# Patient Record
Sex: FEMALE | Race: WHITE | NOT HISPANIC OR LATINO | Employment: UNEMPLOYED | URBAN - METROPOLITAN AREA
[De-identification: names, ages, dates, MRNs, and addresses within clinical notes are randomized per-mention and may not be internally consistent; named-entity substitution may affect disease eponyms.]

---

## 2024-09-07 ENCOUNTER — APPOINTMENT (OUTPATIENT)
Dept: LAB | Facility: CLINIC | Age: 59
End: 2024-09-07
Payer: MEDICARE

## 2024-09-07 DIAGNOSIS — G43.E09 CHRONIC MIGRAINE WITH AURA WITHOUT STATUS MIGRAINOSUS, NOT INTRACTABLE: ICD-10-CM

## 2024-09-07 LAB
ANION GAP SERPL CALCULATED.3IONS-SCNC: 8 MMOL/L (ref 4–13)
BUN SERPL-MCNC: 7 MG/DL (ref 5–25)
CALCIUM SERPL-MCNC: 8.9 MG/DL (ref 8.4–10.2)
CHLORIDE SERPL-SCNC: 98 MMOL/L (ref 96–108)
CO2 SERPL-SCNC: 25 MMOL/L (ref 21–32)
CREAT SERPL-MCNC: 0.56 MG/DL (ref 0.6–1.3)
GFR SERPL CREATININE-BSD FRML MDRD: 102 ML/MIN/1.73SQ M
GLUCOSE P FAST SERPL-MCNC: 195 MG/DL (ref 65–99)
POTASSIUM SERPL-SCNC: 4.5 MMOL/L (ref 3.5–5.3)
SODIUM SERPL-SCNC: 131 MMOL/L (ref 135–147)

## 2024-09-07 PROCEDURE — 36415 COLL VENOUS BLD VENIPUNCTURE: CPT

## 2024-09-07 PROCEDURE — 80048 BASIC METABOLIC PNL TOTAL CA: CPT

## 2024-09-10 ENCOUNTER — HOSPITAL ENCOUNTER (OUTPATIENT)
Dept: MRI IMAGING | Facility: HOSPITAL | Age: 59
Discharge: HOME | End: 2024-09-10
Attending: STUDENT IN AN ORGANIZED HEALTH CARE EDUCATION/TRAINING PROGRAM
Payer: MEDICARE

## 2024-09-10 DIAGNOSIS — G43.E09 CHRONIC MIGRAINE WITH AURA WITHOUT STATUS MIGRAINOSUS, NOT INTRACTABLE: ICD-10-CM

## 2024-09-10 PROCEDURE — A9585 GADOBUTROL INJECTION: HCPCS | Performed by: STUDENT IN AN ORGANIZED HEALTH CARE EDUCATION/TRAINING PROGRAM

## 2024-09-10 PROCEDURE — 70553 MRI BRAIN STEM W/O & W/DYE: CPT

## 2024-09-10 RX ORDER — GADOBUTROL 604.72 MG/ML
8 INJECTION INTRAVENOUS
Status: COMPLETED | OUTPATIENT
Start: 2024-09-10 | End: 2024-09-10

## 2024-09-10 RX ADMIN — GADOBUTROL 8 ML: 604.72 INJECTION INTRAVENOUS at 07:10

## 2024-09-24 LAB
LEFT EYE DIABETIC RETINOPATHY: NORMAL
RIGHT EYE DIABETIC RETINOPATHY: NORMAL

## 2024-10-04 ENCOUNTER — OFFICE VISIT (OUTPATIENT)
Age: 59
End: 2024-10-04
Payer: MEDICARE

## 2024-10-04 VITALS
HEART RATE: 98 BPM | SYSTOLIC BLOOD PRESSURE: 128 MMHG | DIASTOLIC BLOOD PRESSURE: 80 MMHG | WEIGHT: 184 LBS | HEIGHT: 66 IN | RESPIRATION RATE: 18 BRPM | BODY MASS INDEX: 29.57 KG/M2 | TEMPERATURE: 97.7 F

## 2024-10-04 DIAGNOSIS — M25.512 CHRONIC LEFT SHOULDER PAIN: ICD-10-CM

## 2024-10-04 DIAGNOSIS — Z23 NEED FOR COVID-19 VACCINE: ICD-10-CM

## 2024-10-04 DIAGNOSIS — Z11.59 NEED FOR HEPATITIS C SCREENING TEST: ICD-10-CM

## 2024-10-04 DIAGNOSIS — I10 ESSENTIAL HYPERTENSION: ICD-10-CM

## 2024-10-04 DIAGNOSIS — E55.9 VITAMIN D DEFICIENCY: ICD-10-CM

## 2024-10-04 DIAGNOSIS — Z12.4 SCREENING FOR CERVICAL CANCER: ICD-10-CM

## 2024-10-04 DIAGNOSIS — F17.200 TOBACCO DEPENDENCE SYNDROME: ICD-10-CM

## 2024-10-04 DIAGNOSIS — Z23 NEED FOR IMMUNIZATION AGAINST INFLUENZA: ICD-10-CM

## 2024-10-04 DIAGNOSIS — G43.E09 CHRONIC MIGRAINE WITH AURA WITHOUT STATUS MIGRAINOSUS, NOT INTRACTABLE: ICD-10-CM

## 2024-10-04 DIAGNOSIS — E11.9 CONTROLLED TYPE 2 DIABETES MELLITUS WITHOUT COMPLICATION, WITHOUT LONG-TERM CURRENT USE OF INSULIN (HCC): Primary | ICD-10-CM

## 2024-10-04 DIAGNOSIS — G89.29 CHRONIC LEFT SHOULDER PAIN: ICD-10-CM

## 2024-10-04 DIAGNOSIS — K21.9 GASTROESOPHAGEAL REFLUX DISEASE WITHOUT ESOPHAGITIS: ICD-10-CM

## 2024-10-04 DIAGNOSIS — E78.2 MIXED HYPERLIPIDEMIA: ICD-10-CM

## 2024-10-04 DIAGNOSIS — F41.8 MIXED ANXIETY DEPRESSIVE DISORDER: ICD-10-CM

## 2024-10-04 PROBLEM — F10.90 ALCOHOL USE: Status: ACTIVE | Noted: 2023-09-17

## 2024-10-04 PROBLEM — Z78.9 ALCOHOL USE: Status: ACTIVE | Noted: 2023-09-17

## 2024-10-04 PROCEDURE — 91320 SARSCV2 VAC 30MCG TRS-SUC IM: CPT

## 2024-10-04 PROCEDURE — 90673 RIV3 VACCINE NO PRESERV IM: CPT

## 2024-10-04 PROCEDURE — 90480 ADMN SARSCOV2 VAC 1/ONLY CMP: CPT

## 2024-10-04 PROCEDURE — 90471 IMMUNIZATION ADMIN: CPT

## 2024-10-04 PROCEDURE — 99214 OFFICE O/P EST MOD 30 MIN: CPT

## 2024-10-04 RX ORDER — MULTIVITAMIN
1 TABLET ORAL DAILY
COMMUNITY

## 2024-10-04 NOTE — ASSESSMENT & PLAN NOTE
Under control.  Continue losartan.  We will continue to monitor    Orders:    Comprehensive metabolic panel; Future    CBC and differential; Future    UA w Reflex to Microscopic w Reflex to Culture; Future

## 2024-10-04 NOTE — ASSESSMENT & PLAN NOTE
Under reasonable control with diet and exercise  We will continue to monitor      Orders:    Lipid Panel with Direct LDL reflex; Future

## 2024-10-04 NOTE — ASSESSMENT & PLAN NOTE
Under control.  Continue follow-up with neurologist.  Continue current medications.  We will continue to monitor

## 2024-10-04 NOTE — ASSESSMENT & PLAN NOTE
Lab Results   Component Value Date    HGBA1C 7.0 (H) 04/23/2024   Under reasonable control.  Continue metformin.  We will continue to monitor    Orders:    Albumin / creatinine urine ratio; Future    Comprehensive metabolic panel; Future    Hemoglobin A1C; Future    CBC and differential; Future

## 2024-10-04 NOTE — ASSESSMENT & PLAN NOTE
Currently stable.  We will continue to monitor.  We will refer to behavioral health for evaluation    Orders:    Ambulatory Referral to Orthopedic Surgery; Future

## 2024-10-04 NOTE — PROGRESS NOTES
Ambulatory Visit  Name: Leeanna Sanabria      : 1965      MRN: [id number]  Encounter Provider: Iam Vazquez MD  Encounter Date: 10/4/2024   Encounter department: [facility] PRIMARY CARE    Assessment & Plan  Controlled type 2 diabetes mellitus without complication, without long-term current use of insulin (HCC)    Lab Results   Component Value Date    HGBA1C 7.0 (H) 2024   Under reasonable control.  Continue metformin.  We will continue to monitor    Orders:  •  Albumin / creatinine urine ratio; Future  •  Comprehensive metabolic panel; Future  •  Hemoglobin A1C; Future  •  CBC and differential; Future    Mixed hyperlipidemia  Under reasonable control with diet and exercise  We will continue to monitor      Orders:  •  Lipid Panel with Direct LDL reflex; Future    Essential hypertension  Under control.  Continue losartan.  We will continue to monitor    Orders:  •  Comprehensive metabolic panel; Future  •  CBC and differential; Future  •  UA w Reflex to Microscopic w Reflex to Culture; Future    Chronic migraine with aura without status migrainosus, not intractable  Under control.  Continue follow-up with neurologist.  Continue current medications.  We will continue to monitor         Mixed anxiety depressive disorder  Currently stable.  We will continue to monitor.  We will refer to behavioral health for evaluation    Orders:  •  Ambulatory Referral to Orthopedic Surgery; Future    Gastroesophageal reflux disease without esophagitis  Under control.    Continue current medication.    We will re-evaluate at next office visit.           Need for immunization against influenza    Orders:  •  influenza vaccine, recombinant, PF, 0.5 mL IM (Flublok)    Need for COVID-19 vaccine    Orders:  •  COVID-19 Pfizer mRNA vaccine 12 yr and older (Comirnaty pre-filled syringe)    Tobacco dependence syndrome  Quit smoking  Options for nicotine patch, nicotine gum or any medication discussed with  patient  Potential consequences of smoking discussed with patient  Patient seems to be understood well           Vitamin D deficiency    Orders:  •  Vitamin D 25 hydroxy; Future    Screening for cervical cancer    Orders:  •  Ambulatory Referral to Obstetrics / Gynecology; Future    Chronic left shoulder pain    Orders:  •  Ambulatory referral to Psych Services; Future    Need for hepatitis C screening test    Orders:  •  Hepatitis C Antibody; Future      Tobacco Cessation Counseling: Tobacco cessation counseling was provided. The patient is sincerely urged to quit consumption of tobacco. She is not ready to quit tobacco. Medication options and side effects of medication discussed. Patient refused medication.     History of Present Illness     Patient here to establish care and for review of chronic medical problems and  the labs and imaging if it is applicable.  Currently has no specific complaints other than mentioned in the review of systems  Denies chest pain, SOB, cough, abdominal pain, nausea, vomiting, fever, chills, lightheadedness, dizziness,headache, tingling or numbness.No bowel or bladder problem.        History obtained from : patient  Review of Systems   Constitutional:  Negative for chills, fatigue and fever.   HENT:  Negative for congestion, ear pain, rhinorrhea, sneezing and sore throat.    Eyes:  Negative for redness, itching and visual disturbance.   Respiratory:  Negative for cough, chest tightness and shortness of breath.    Cardiovascular:  Negative for chest pain, palpitations and leg swelling.   Gastrointestinal:  Negative for abdominal pain, blood in stool, diarrhea, nausea and vomiting.   Endocrine: Negative for cold intolerance and heat intolerance.   Genitourinary:  Negative for dysuria, frequency and urgency.   Musculoskeletal:  Negative for arthralgias, back pain and myalgias.   Skin:  Negative for color change and rash.   Neurological:  Negative for dizziness, weakness,  light-headedness, numbness and headaches.   Hematological:  Does not bruise/bleed easily.   Psychiatric/Behavioral:  Negative for agitation, behavioral problems and confusion.      Medical History Reviewed by provider this encounter:  Tobacco  Allergies  Meds  Problems  Med Hx  Surg Hx  Fam Hx       Past Medical History   Past Medical History:   Diagnosis Date   • Diabetes mellitus (HCC)    • Hypertension    • Psychiatric disorder     depression     History reviewed. No pertinent surgical history.  History reviewed. No pertinent family history.  Current Outpatient Medications on File Prior to Visit   Medication Sig Dispense Refill   • aspirin 81 mg chewable tablet Chew     • calcium-vitamin D (Oscal 500/200 D-3) 500 mg-200 units per tablet Take by mouth     • Cyanocobalamin (VITAMIN B 12 PO) Take by mouth     • ketoconazole (NIZORAL) 2 % shampoo Use daily for two weeks then use 3 times a week 120 mL 6   • losartan (COZAAR) 50 mg tablet Take 1 tablet (50 mg total) by mouth daily 90 tablet 1   • metFORMIN (GLUCOPHAGE) 500 mg tablet Take 500 mg by mouth daily with breakfast     • Multiple Vitamin (multivitamin) tablet Take 1 tablet by mouth daily     • Omega-3 Fatty Acids (FISH OIL PO) Take by mouth     • pantoprazole (PROTONIX) 40 mg tablet TAKE 1 TABLET BY MOUTH DAILY 30 tablet 5   • rimegepant sulfate (Nurtec) 75 mg TBDP Take one NURTEC 75 mg at onset under tongue. Limit 1 in 24 hours 16 tablet 6   • topiramate (TOPAMAX) 25 mg tablet 1 tab PO QHS for 1 week, increase as tolerated to 1 tab BID for 1 week, then 1 tab QAM and 2 tabs QHS for 1 week and finish at 2 tabs BID. 120 tablet 4   • [DISCONTINUED] celecoxib (CeleBREX) 100 mg capsule Take by mouth (Patient not taking: Reported on 4/17/2024)     • [DISCONTINUED] ergocalciferol (VITAMIN D2) 50,000 units Take 1 capsule (50,000 Units total) by mouth once a week for 8 doses (Patient not taking: Reported on 6/21/2024) 8 capsule 0   • [DISCONTINUED] FLUoxetine  (PROzac) 10 mg capsule TAKE 1 CAPSULE BY MOUTH EVERY DAY 90 capsule 1   • [DISCONTINUED] meloxicam (MOBIC) 7.5 mg tablet Take 1 tablet (7.5 mg total) by mouth daily (Patient not taking: Reported on 4/17/2024) 30 tablet 0     No current facility-administered medications on file prior to visit.     Allergies   Allergen Reactions   • Lisinopril Cough   • Monosodium Glutamate - Food Allergy Palpitations   • Other Palpitations     Pt is allergic to MSG, says she gets a fast heart rate and palpitations      Current Outpatient Medications on File Prior to Visit   Medication Sig Dispense Refill   • aspirin 81 mg chewable tablet Chew     • calcium-vitamin D (Oscal 500/200 D-3) 500 mg-200 units per tablet Take by mouth     • Cyanocobalamin (VITAMIN B 12 PO) Take by mouth     • ketoconazole (NIZORAL) 2 % shampoo Use daily for two weeks then use 3 times a week 120 mL 6   • losartan (COZAAR) 50 mg tablet Take 1 tablet (50 mg total) by mouth daily 90 tablet 1   • metFORMIN (GLUCOPHAGE) 500 mg tablet Take 500 mg by mouth daily with breakfast     • Multiple Vitamin (multivitamin) tablet Take 1 tablet by mouth daily     • Omega-3 Fatty Acids (FISH OIL PO) Take by mouth     • pantoprazole (PROTONIX) 40 mg tablet TAKE 1 TABLET BY MOUTH DAILY 30 tablet 5   • rimegepant sulfate (Nurtec) 75 mg TBDP Take one NURTEC 75 mg at onset under tongue. Limit 1 in 24 hours 16 tablet 6   • topiramate (TOPAMAX) 25 mg tablet 1 tab PO QHS for 1 week, increase as tolerated to 1 tab BID for 1 week, then 1 tab QAM and 2 tabs QHS for 1 week and finish at 2 tabs BID. 120 tablet 4   • [DISCONTINUED] celecoxib (CeleBREX) 100 mg capsule Take by mouth (Patient not taking: Reported on 4/17/2024)     • [DISCONTINUED] ergocalciferol (VITAMIN D2) 50,000 units Take 1 capsule (50,000 Units total) by mouth once a week for 8 doses (Patient not taking: Reported on 6/21/2024) 8 capsule 0   • [DISCONTINUED] FLUoxetine (PROzac) 10 mg capsule TAKE 1 CAPSULE BY MOUTH EVERY  "DAY 90 capsule 1   • [DISCONTINUED] meloxicam (MOBIC) 7.5 mg tablet Take 1 tablet (7.5 mg total) by mouth daily (Patient not taking: Reported on 4/17/2024) 30 tablet 0     No current facility-administered medications on file prior to visit.      Social History     Tobacco Use   • Smoking status: Every Day     Current packs/day: 0.50     Types: Cigarettes   • Smokeless tobacco: Never   Vaping Use   • Vaping status: Never Used   Substance and Sexual Activity   • Alcohol use: Yes     Comment: occasionally   • Drug use: Never   • Sexual activity: Not on file         Objective     /80 (BP Location: Left arm, Patient Position: Sitting, Cuff Size: Large)   Pulse 98   Temp 97.7 °F (36.5 °C) (Temporal)   Resp 18   Ht 5' 6\" (1.676 m)   Wt 83.5 kg (184 lb)   BMI 29.70 kg/m²     Physical Exam  Vitals and nursing note reviewed.   Constitutional:       General: She is not in acute distress.     Appearance: Normal appearance. She is well-developed. She is not ill-appearing, toxic-appearing or diaphoretic.   HENT:      Head: Normocephalic and atraumatic.      Nose: Nose normal.      Mouth/Throat:      Mouth: Mucous membranes are moist.      Pharynx: Oropharynx is clear.   Eyes:      General: No scleral icterus.        Right eye: No discharge.         Left eye: No discharge.      Extraocular Movements: Extraocular movements intact.      Conjunctiva/sclera: Conjunctivae normal.      Pupils: Pupils are equal, round, and reactive to light.   Cardiovascular:      Rate and Rhythm: Normal rate and regular rhythm.      Pulses: Normal pulses.      Heart sounds: Normal heart sounds. No murmur heard.  Pulmonary:      Effort: Pulmonary effort is normal. No respiratory distress.      Breath sounds: Normal breath sounds. No wheezing, rhonchi or rales.   Abdominal:      General: Abdomen is flat. Bowel sounds are normal. There is no distension.      Palpations: Abdomen is soft.      Tenderness: There is no abdominal tenderness. There " is no right CVA tenderness or left CVA tenderness.   Musculoskeletal:         General: No swelling or tenderness.      Cervical back: Normal range of motion and neck supple. No rigidity.      Right lower leg: No edema.      Left lower leg: No edema.      Comments: Limited range of motion left shoulder   Lymphadenopathy:      Cervical: No cervical adenopathy.   Skin:     General: Skin is warm and dry.      Capillary Refill: Capillary refill takes 2 to 3 seconds.      Coloration: Skin is not jaundiced or pale.   Neurological:      General: No focal deficit present.      Mental Status: She is alert and oriented to person, place, and time. Mental status is at baseline.      Motor: No weakness.      Gait: Gait normal.   Psychiatric:         Mood and Affect: Mood normal.         Behavior: Behavior normal.

## 2024-10-07 ENCOUNTER — TELEPHONE (OUTPATIENT)
Age: 59
End: 2024-10-07

## 2024-10-07 NOTE — TELEPHONE ENCOUNTER
Routine referral received 10/4/24 from PCP for med management. Outreach call placed to inquire about pt’s interest in services and being added to the appropriate wait list. Pt declines interest in med management. Pt expresses more of an interest in talk therapy at this time, confirms interest in being added to wait list.    Patient has been added to the Talk Therapy wait list with a referral.    Insurance: Pete ([facility]  Insurance Type:    Commercial []   Medicaid [x]   [facility] (if applicable) - [place]  Promise verified   Medicare []  Were outside resources sent: Yes [] No [x]

## 2024-10-07 NOTE — TELEPHONE ENCOUNTER
[facility] has still not gotten the order for the Metformin.      Please advise.  Patient only has dose for tomorrow morning and needs this filled ASAP.

## 2024-10-08 DIAGNOSIS — E11.9 CONTROLLED TYPE 2 DIABETES MELLITUS WITHOUT COMPLICATION, WITHOUT LONG-TERM CURRENT USE OF INSULIN (HCC): Primary | ICD-10-CM

## 2024-10-08 NOTE — TELEPHONE ENCOUNTER
Patient is calling again.  Please send refill ASAP she does not have a car and has a ride to the pharmacy now.

## 2024-10-18 ENCOUNTER — TELEPHONE (OUTPATIENT)
Age: 59
End: 2024-10-18

## 2024-10-18 NOTE — TELEPHONE ENCOUNTER
Patient called, sxs runny nose, cough, sore throat body aches nausea and fever 99.4.  sxs started on Monday.  She stated she was around someone who has the flu, so she is pretty dure that is what she has.    Did not test for covid.

## 2024-10-18 NOTE — TELEPHONE ENCOUNTER
Called and spoke to patient advised Tylenol, fluids, Robitussin DM, warm salt water gargles, and ocean nasal spray with rest

## 2024-10-18 NOTE — TELEPHONE ENCOUNTER
She might need to be tested for flu may go to urgent care unless we have appointment in the office

## 2024-11-04 ENCOUNTER — TELEPHONE (OUTPATIENT)
Age: 59
End: 2024-11-04

## 2024-11-04 DIAGNOSIS — Z01.00 ENCOUNTER FOR EXAMINATION OF VISION: Primary | ICD-10-CM

## 2024-11-04 NOTE — TELEPHONE ENCOUNTER
Patient is looking to stay within the [facility] for ophthalmologist who Dr Lawson may recommend. We gave the name of our affiliates over at [facility].  Please place referral and call patient once referral is in thank you

## 2024-11-05 NOTE — TELEPHONE ENCOUNTER
I placed a referral for patient. Called patient to make aware , went to voicemail, left a message for a call back.

## 2024-11-11 ENCOUNTER — TELEPHONE (OUTPATIENT)
Age: 59
End: 2024-11-11

## 2024-11-11 NOTE — TELEPHONE ENCOUNTER
Advised pt letter has been written and is available in her [employer] portal. Pt does not own a printer. Requested letter be mailed to home address on file.       Clerical - can you please mail 11/11/24 letter from Dr. Steven to pt's home address on file. Thank you!

## 2024-11-11 NOTE — TELEPHONE ENCOUNTER
Pt states she is going to lose medicaid insurance at the end of this month as her income is too high. Pt filed appeal and will be able to remain on Nurtec while appeal is in process. No timeframe for appeal given. She states , Mr. Mariano, advised pt if she could get a letter of medical necessity for Nurtec and topiramate, they could count it as a medical deductible to reduce monthly income and pt may be able to remain on medicaid insurance.      Nurtec $3 copay currently  W/o insurance $1999.03/mo    Topiramate $0 copay currently  W/o insurance $16.64/mo    [phone] - okay to leave detailed msg.     Dr. Steven - are you agreeable to providing a letter of medical necessity as requested?

## 2024-11-11 NOTE — LETTER
November 11, 2024     Patient: Leeanna Sanabria   YOB: 1965       To whom it may concern,    I am writing on behalf of my patient, Leeanna Sanabria, to provide documentation of medical necessity for the prescriptions of Nurtec ODT (Rimegepant) and Topamax for the management of their migraine condition. As Leeanna's neurologist, I have determined that these medications are crucial for the effective management of their symptoms and overall quality of life.    Leeanna has been under my care for the treatment of chronic migraine, characterized by frequent throbbing pain throughout her head associated with nausea, vomiting, photophobia, phonophobia, blurry vision, and dizziness. Despite trying multiple previous treatment options, she has experienced limited success in managing their migraine symptoms.    Given the lack of adequate response to previous treatments and the debilitating nature of Leeanna’s migraines, it is medically necessary to continue therapy with Nurtec and Topamax since she has found relief with them.    I respectfully request that you consider allowing her to continue with Topamax and Nurtec to manage her migraine condition effectively.     Thank you for considering this request. Please feel free to contact me at [phone] if you require any additional information or have further inquiries regarding this case.    Sincerely,  Gasper Steven, DO    Neurology  [address] [place], [address]  Phone: [phone]

## 2024-11-13 ENCOUNTER — CONSULT (OUTPATIENT)
Dept: OBGYN CLINIC | Facility: CLINIC | Age: 59
End: 2024-11-13
Payer: MEDICARE

## 2024-11-13 VITALS
SYSTOLIC BLOOD PRESSURE: 144 MMHG | DIASTOLIC BLOOD PRESSURE: 88 MMHG | BODY MASS INDEX: 28.93 KG/M2 | HEIGHT: 66 IN | WEIGHT: 180 LBS

## 2024-11-13 DIAGNOSIS — Z12.4 ENCOUNTER FOR SCREENING FOR MALIGNANT NEOPLASM OF CERVIX: ICD-10-CM

## 2024-11-13 DIAGNOSIS — Z01.419 WELL WOMAN EXAM WITH ROUTINE GYNECOLOGICAL EXAM: ICD-10-CM

## 2024-11-13 DIAGNOSIS — Z12.4 CERVICAL CANCER SCREENING: ICD-10-CM

## 2024-11-13 DIAGNOSIS — Z12.4 SCREENING FOR CERVICAL CANCER: ICD-10-CM

## 2024-11-13 DIAGNOSIS — Z11.51 SCREENING FOR HPV (HUMAN PAPILLOMAVIRUS): ICD-10-CM

## 2024-11-13 DIAGNOSIS — Z01.419 ENCOUNTER FOR WELL WOMAN EXAM: Primary | ICD-10-CM

## 2024-11-13 DIAGNOSIS — Z12.39 ENCOUNTER FOR SCREENING BREAST EXAMINATION: ICD-10-CM

## 2024-11-13 DIAGNOSIS — Z78.0 POSTMENOPAUSAL: ICD-10-CM

## 2024-11-13 DIAGNOSIS — Z12.11 SCREEN FOR COLON CANCER: ICD-10-CM

## 2024-11-13 DIAGNOSIS — N61.0 BREAST INFECTION: ICD-10-CM

## 2024-11-13 PROCEDURE — G0476 HPV COMBO ASSAY CA SCREEN: HCPCS | Performed by: PHYSICIAN ASSISTANT

## 2024-11-13 PROCEDURE — 99396 PREV VISIT EST AGE 40-64: CPT | Performed by: PHYSICIAN ASSISTANT

## 2024-11-13 PROCEDURE — G0145 SCR C/V CYTO,THINLAYER,RESCR: HCPCS | Performed by: PHYSICIAN ASSISTANT

## 2024-11-13 RX ORDER — CEPHALEXIN 500 MG/1
500 CAPSULE ORAL EVERY 12 HOURS SCHEDULED
Qty: 14 CAPSULE | Refills: 1 | Status: SHIPPED | OUTPATIENT
Start: 2024-11-13 | End: 2024-11-20

## 2024-11-14 ENCOUNTER — TELEPHONE (OUTPATIENT)
Age: 59
End: 2024-11-14

## 2024-11-14 ENCOUNTER — OFFICE VISIT (OUTPATIENT)
Age: 59
End: 2024-11-14
Payer: MEDICARE

## 2024-11-14 VITALS
HEART RATE: 112 BPM | BODY MASS INDEX: 29.05 KG/M2 | DIASTOLIC BLOOD PRESSURE: 88 MMHG | OXYGEN SATURATION: 97 % | SYSTOLIC BLOOD PRESSURE: 136 MMHG | HEIGHT: 66 IN | RESPIRATION RATE: 20 BRPM | TEMPERATURE: 97.4 F

## 2024-11-14 DIAGNOSIS — F41.9 ANXIETY: Primary | ICD-10-CM
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
## 2024-11-14 LAB
HPV HR 12 DNA CVX QL NAA+PROBE: NEGATIVE
HPV16 DNA CVX QL NAA+PROBE: NEGATIVE
HPV18 DNA CVX QL NAA+PROBE: POSITIVE

## 2024-11-14 PROCEDURE — 99213 OFFICE O/P EST LOW 20 MIN: CPT | Performed by: INTERNAL MEDICINE

## 2024-11-14 RX ORDER — HYDROXYZINE HYDROCHLORIDE 25 MG/1
25 TABLET, FILM COATED ORAL EVERY 6 HOURS PRN
Qty: 30 TABLET | Refills: 0 | Status: SHIPPED | OUTPATIENT
Start: 2024-11-14
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
## 2024-11-14 NOTE — TELEPHONE ENCOUNTER
Patient was seen on 11/14/24 by Miri HAYES and was told she needs to come back in 10 days for follow up   No appts were found in that time range . Patient very anxious about breast infection
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
## 2024-11-15 ENCOUNTER — TELEPHONE (OUTPATIENT)
Age: 59
End: 2024-11-15
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
## 2024-11-15 NOTE — TELEPHONE ENCOUNTER
Patient returned call in regards to ASAP Referral  in attempts to verify patient's needs of services. Writer verified Full Name, , Callback Number, Address, and Insurance. Writer spoke with patient who confirmed needs of services and has been scheduled.    closed, Referral Completed
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
## 2024-11-19 NOTE — PROGRESS NOTES
"Assessment/Plan:      Diagnoses and all orders for this visit:    Encounter for well woman exam    Encounter for screening breast examination    Postmenopausal    Breast infection  -     cephalexin (KEFLEX) 500 mg capsule; Take 1 capsule (500 mg total) by mouth every 12 (twelve) hours for 7 days    Cervical cancer screening    Screening for cervical cancer  -     Ambulatory Referral to Obstetrics / Gynecology    Screening for HPV (human papillomavirus)  -     Liquid-based pap, screening  -     HPV High Risk    Well woman exam with routine gynecological exam  -     Liquid-based pap, screening  -     HPV High Risk    Encounter for screening for malignant neoplasm of cervix  -     Liquid-based pap, screening  -     HPV High Risk    Screen for colon cancer  -     Ambulatory Referral to Gastroenterology; Future          Subjective:     Patient ID: Leeanna Sanabria is a 59 y.o. female.    Pt presents for her annual exam today--  She has no complaints except occ \"boil\" on breast  Off and on x years  She has no bleeding or pelvic pain  Bowel and bladder are regular  Colonoscopy--  No breast concerns today  Last mammo--few years  H/o benign breast bx    pap today.    Rx mamm  Rx colon  Daily ca, d  Rx kefelx 500mg bid x7 days  Rec check fbs        Review of Systems   Constitutional:  Negative for chills, fever and unexpected weight change.   Gastrointestinal:  Negative for abdominal pain, blood in stool, constipation and diarrhea.   Genitourinary: Negative.          Objective:     Physical Exam  Vitals and nursing note reviewed.   Constitutional:       Appearance: Normal appearance. She is well-developed.   HENT:      Head: Normocephalic and atraumatic.   Chest:   Breasts:     Right: No inverted nipple, mass, nipple discharge or skin change.      Left: No inverted nipple, mass, nipple discharge or skin change.   Abdominal:      Palpations: Abdomen is soft.   Genitourinary:     Exam position: Supine.      Labia:         " Right: No rash, tenderness or lesion.         Left: No rash, tenderness or lesion.       Vagina: Normal.      Cervix: No cervical motion tenderness, discharge or friability.      Adnexa:         Right: No mass, tenderness or fullness.          Left: No mass, tenderness or fullness.     Musculoskeletal:      Cervical back: Normal range of motion.   Lymphadenopathy:      Lower Body: No right inguinal adenopathy. No left inguinal adenopathy.   Neurological:      Mental Status: She is alert.

## 2024-11-20 ENCOUNTER — OFFICE VISIT (OUTPATIENT)
Dept: OBGYN CLINIC | Facility: CLINIC | Age: 59
End: 2024-11-20
Payer: MEDICARE

## 2024-11-20 ENCOUNTER — HOSPITAL ENCOUNTER (OUTPATIENT)
Dept: RADIOLOGY | Facility: HOSPITAL | Age: 59
Discharge: HOME | End: 2024-11-20
Attending: ORTHOPAEDIC SURGERY
Payer: MEDICARE

## 2024-11-20 VITALS
SYSTOLIC BLOOD PRESSURE: 164 MMHG | BODY MASS INDEX: 29.15 KG/M2 | WEIGHT: 181.4 LBS | DIASTOLIC BLOOD PRESSURE: 86 MMHG | HEIGHT: 66 IN

## 2024-11-20 DIAGNOSIS — F41.8 MIXED ANXIETY DEPRESSIVE DISORDER: ICD-10-CM

## 2024-11-20 DIAGNOSIS — M25.512 LEFT SHOULDER PAIN, UNSPECIFIED CHRONICITY: ICD-10-CM

## 2024-11-20 DIAGNOSIS — M25.512 LEFT SHOULDER PAIN, UNSPECIFIED CHRONICITY: Primary | ICD-10-CM

## 2024-11-20 DIAGNOSIS — M19.012 OSTEOARTHRITIS OF GLENOHUMERAL JOINT, LEFT: ICD-10-CM

## 2024-11-20 DIAGNOSIS — M87.00 AVN (AVASCULAR NECROSIS OF BONE) (HCC): ICD-10-CM

## 2024-11-20 PROCEDURE — 99244 OFF/OP CNSLTJ NEW/EST MOD 40: CPT | Performed by: ORTHOPAEDIC SURGERY

## 2024-11-20 PROCEDURE — 73030 X-RAY EXAM OF SHOULDER: CPT

## 2024-11-20 RX ORDER — CHLORHEXIDINE GLUCONATE ORAL RINSE 1.2 MG/ML
15 SOLUTION DENTAL ONCE
OUTPATIENT
Start: 2024-11-20 | End: 2024-11-20

## 2024-11-20 NOTE — PROGRESS NOTES
[facility] [address] [address] [phone]       Leeanna Sanabria  [id number]  1965    ORTHOPAEDIC SURGERY OUTPATIENT NOTE  11/20/2024      HISTORY:  59 y.o. female presents today evaluation for her left shoulder. She was referred by her PCP Taylor.  Has history of left humeral shaft fracture nondisplaced treated nonop by Dr. Rowell in 2021.  Patient states since she had an injury in 21 she is unable to fully lift her left arm up.  States she has been doing the home exercises occasionally for physical therapy.  States she has trouble sleeping at night when she lays on the left side and wakes her up.  States she has sharp pain with lifting, pushing and pulling.  Patient is a caretaker for her .  Patient's pain level is a 7 out of 10 and SANE score 75%.    Patient is a diabetic and her last A1c on 4/23/2024 was 7.0.    Past Medical History:   Diagnosis Date    Diabetes mellitus (HCC)     Hypertension     Psychiatric disorder     depression       History reviewed. No pertinent surgical history.    Social History     Socioeconomic History    Marital status: /Civil Union     Spouse name: Not on file    Number of children: Not on file    Years of education: Not on file    Highest education level: Not on file   Occupational History    Not on file   Tobacco Use    Smoking status: Every Day     Types: Cigarettes     Passive exposure: Current    Smokeless tobacco: Never   Vaping Use    Vaping status: Never Used   Substance and Sexual Activity    Alcohol use: Yes     Comment: occasionally    Drug use: Never    Sexual activity: Not Currently     Partners: Male     Birth control/protection: Post-menopausal     Comment:    Other Topics Concern    Not on file   Social History Narrative    Not on file     Social Drivers of Health     Financial Resource Strain: Not on file   Food Insecurity: Not on file    Transportation Needs: Not on file   Physical Activity: Not on file   Stress: Not on file   Social Connections: Not on file   Intimate Partner Violence: Not on file   Housing Stability: Not on file       Family History   Problem Relation Age of Onset    Breast cancer Neg Hx     Colon cancer Neg Hx     Ovarian cancer Neg Hx         Patient's Medications   New Prescriptions    No medications on file   Previous Medications    ASPIRIN 81 MG CHEWABLE TABLET    Chew    CALCIUM-VITAMIN D (OSCAL 500/200 D-3) 500 MG-200 UNITS PER TABLET    Take by mouth    CEPHALEXIN (KEFLEX) 500 MG CAPSULE    Take 1 capsule (500 mg total) by mouth every 12 (twelve) hours for 7 days    CYANOCOBALAMIN (VITAMIN B 12 PO)    Take by mouth    HYDROXYZINE HCL (ATARAX) 25 MG TABLET    Take 1 tablet (25 mg total) by mouth every 6 (six) hours as needed for itching    KETOCONAZOLE (NIZORAL) 2 % SHAMPOO    Use daily for two weeks then use 3 times a week    LOSARTAN (COZAAR) 50 MG TABLET    Take 1 tablet (50 mg total) by mouth daily    METFORMIN (GLUCOPHAGE) 1000 MG TABLET    Take 1 tablet (1,000 mg total) by mouth 2 (two) times a day with meals    MULTIPLE VITAMIN (MULTIVITAMIN) TABLET    Take 1 tablet by mouth daily    OMEGA-3 FATTY ACIDS (FISH OIL PO)    Take by mouth    PANTOPRAZOLE (PROTONIX) 40 MG TABLET    TAKE 1 TABLET BY MOUTH DAILY    RIMEGEPANT SULFATE (NURTEC) 75 MG TBDP    Take one NURTEC 75 mg at onset under tongue. Limit 1 in 24 hours    TOPIRAMATE (TOPAMAX) 25 MG TABLET    1 tab PO QHS for 1 week, increase as tolerated to 1 tab BID for 1 week, then 1 tab QAM and 2 tabs QHS for 1 week and finish at 2 tabs BID.   Modified Medications    No medications on file   Discontinued Medications    No medications on file       Allergies   Allergen Reactions    Lisinopril Cough    Monosodium Glutamate - Food Allergy Palpitations    Other Palpitations     Pt is allergic to MSG, says she gets a fast heart rate and palpitations        /86 (BP  "Location: Left arm, Patient Position: Sitting, Cuff Size: Standard)   Ht 5' 6\" (1.676 m)   Wt 82.3 kg (181 lb 6.4 oz)   BMI 29.28 kg/m²      REVIEW OF SYSTEMS:  Constitutional: Negative.    HEENT: Negative.    Respiratory: Negative.    Skin: Negative.    Neurological: Negative.    Psychiatric/Behavioral: Negative.  Musculoskeletal: Negative except for that mentioned in the HPI.    Gen: No acute distress, resting comfortably in bed  HEENT: Eyes clear, moist mucus membranes, hearing intact  Respiratory: No audible wheezing or stridor  Cardiovascular: Well Perfused peripherally, 2+ distal pulse  Abdomen: nondistended, no peritoneal signs     PHYSICAL EXAM:    LEFT SHOULDER:    Appearance: Skin intact     Forward flexion:   90 degrees / passive 100   Abduction:  70 degrees   External rotation at 90 degrees abduction:   90 degrees   Internal rotation at 90 degrees abduction:  90 degrees   External rotation at 0 degrees:   5 degrees   Internal rotation: Left hip      STRENGTH:  Forward flexion:  4/5   Abduction:  5/5   External rotation:  4+/5   Internal rotation:  5/5     Radial/median/ulnar nerve intact  <2 sec cap refill       IMAGING:  XR left shoulder demonstrates severe end stage osteoarthritis, with AVN from previous fracture malunion     ASSESSMENT AND PLAN:  59 y.o. female with severe left glenohumeral osteoarthritis with AVN most likely from fracture with malunion    X-ray left shoulder in the office today.  Discussed with patient treatment which would be a surgical option a reverse total shoulder arthroplasty.  Patient agrees and would like to proceed forward scheduling for a reverse left total shoulder arthroplasty.  Will be ordering a CT blueprint left shoulder preop planning with 3D reconstruction and please separate the scapula from the humerus.  The patient understands the risks and benefits of the procedure with risks including pain, stiffness, infection, neurovascular injury, recurrence of symptoms, " failure of surgical procedure, inadvertent intraoperative complications, blood loss, blood clots, allergic reaction to anesthesia, stroke, heart attack, all up to and including to death. The patient understood and did consent for surgery today.         Scribe Attestation      I,:  Fausto Benson MA am acting as a scribe while in the presence of the attending physician.:       I,:  Stacy Kenny DO personally performed the services described in this documentation    as scribed in my presence.:

## 2024-11-21 PROBLEM — M25.512 LEFT SHOULDER PAIN, UNSPECIFIED CHRONICITY: Status: ACTIVE | Noted: 2024-11-21

## 2024-11-22 ENCOUNTER — RESULTS FOLLOW-UP (OUTPATIENT)
Dept: OBGYN CLINIC | Facility: CLINIC | Age: 59
End: 2024-11-22

## 2024-11-22 LAB
LAB AP GYN PRIMARY INTERPRETATION: NORMAL
Lab: NORMAL

## 2024-11-26 ENCOUNTER — NURSE TRIAGE (OUTPATIENT)
Age: 59
End: 2024-11-26

## 2024-11-26 DIAGNOSIS — B37.31 YEAST INFECTION INVOLVING THE VAGINA AND SURROUNDING AREA: Primary | ICD-10-CM

## 2024-11-26 RX ORDER — FLUCONAZOLE 150 MG/1
150 TABLET ORAL DAILY
Qty: 1 TABLET | Refills: 0 | Status: SHIPPED | OUTPATIENT
Start: 2024-11-26 | End: 2024-11-27

## 2024-11-26 NOTE — TELEPHONE ENCOUNTER
"Patient calling to report completing keflex and now with vulvovaginal itching burning and white discharge. Denies other symptoms. 1 dose diflucan sent per protocol to [facility]. Advised no underwear, especially for sleep, or cotton only if necessary, daily gentle cleansing with unscented soap and warm water, change out of wet or sweaty clothing timely, and loose vs. tight fitting clothing.    \"How many yeast infections have you had in the past 2 years?\"    -If 1 or less, prescribe Diflucan 150mg PO. If no improvement after 1 dose treatment, please instruct patient to call back to schedule appointment. (should be seen within 3 days)    -If more than 4 or more yeast infections in a year or if they are recurrent, schedule appointment within 3 days.    For OB patients: if patient wishes to use over the counter monistat, recommend only the 7 day treatment.       Reason for Disposition  • Symptoms of a yeast infection (i.e., itchy, white discharge, not bad smelling) and feels like prior vaginal yeast infections    Answer Assessment - Initial Assessment Questions  1. SYMPTOM: \"What's the main symptom you're concerned about?\" (e.g., pain, itching, dryness)      Itching, pain, white discharge  2. LOCATION: \"Where is the  s/s located?\" (e.g., inside/outside, left/right)      vulvovaginal  3. ONSET: \"When did the  s/s  start?\"      2 days ago  4. PAIN: \"Is there any pain?\" If Yes, ask: \"How bad is it?\" (Scale: 1-10; mild, moderate, severe)      4/10 - burning vaginal  5. ITCHING: \"Is there any itching?\" If Yes, ask: \"How bad is it?\" (Scale: 1-10; mild, moderate, severe)      severe  6. CAUSE: \"What do you think is causing the discharge?\" \"Have you had the same problem before?\" \"What happened then?\"      Yeast?  7. OTHER SYMPTOMS: \"Do you have any other symptoms?\" (e.g., fever, itching, vaginal bleeding, pain with urination, injury to genital area, vaginal foreign body)      Denies fever, urinary " "concerns  8. PREGNANCY: \"Is there any chance you are pregnant?\" \"When was your last menstrual period?\"      Postmeno    Protocols used: Vaginal Symptoms-Adult-[place]    "

## 2024-11-27 ENCOUNTER — HOSPITAL ENCOUNTER (OUTPATIENT)
Dept: CT IMAGING | Facility: HOSPITAL | Age: 59
Discharge: HOME | End: 2024-11-27
Attending: ORTHOPAEDIC SURGERY
Payer: MEDICARE

## 2024-11-27 DIAGNOSIS — M87.00 AVN (AVASCULAR NECROSIS OF BONE) (HCC): ICD-10-CM

## 2024-11-27 DIAGNOSIS — M25.512 LEFT SHOULDER PAIN, UNSPECIFIED CHRONICITY: ICD-10-CM

## 2024-11-27 DIAGNOSIS — M19.012 OSTEOARTHRITIS OF GLENOHUMERAL JOINT, LEFT: ICD-10-CM

## 2024-11-27 PROCEDURE — 73200 CT UPPER EXTREMITY W/O DYE: CPT

## 2024-12-02 ENCOUNTER — OFFICE VISIT (OUTPATIENT)
Dept: OBGYN CLINIC | Facility: CLINIC | Age: 59
End: 2024-12-02
Payer: MEDICARE

## 2024-12-02 VITALS
DIASTOLIC BLOOD PRESSURE: 82 MMHG | SYSTOLIC BLOOD PRESSURE: 128 MMHG | HEIGHT: 66 IN | BODY MASS INDEX: 28.7 KG/M2 | WEIGHT: 178.6 LBS

## 2024-12-02 DIAGNOSIS — N63.13 MASS OF LOWER OUTER QUADRANT OF RIGHT BREAST: Primary | ICD-10-CM

## 2024-12-02 PROCEDURE — 99213 OFFICE O/P EST LOW 20 MIN: CPT | Performed by: PHYSICIAN ASSISTANT

## 2024-12-03 ENCOUNTER — TELEPHONE (OUTPATIENT)
Age: 59
End: 2024-12-03

## 2024-12-03 NOTE — TELEPHONE ENCOUNTER
Patients spouse Frederic calling about breast imaging, as pts spouse is having a hard time to connect to the breast center to schedule.     Contacted Tressa and Frederic was warm transferred.

## 2024-12-03 NOTE — TELEPHONE ENCOUNTER
Patient called in stating she has been trying to scheduled her dx mammo and r breast us but has not been able to get a hold of anyone and had to leave a VM. Called over on behalf of patient, however had to leave a VM. Patient was disconnected as I went to click back over.     Called patient, left detailed VM on her phone advising I did leave a VM with the [facility] to contact her to schedule her dx imaging.

## 2024-12-06 NOTE — PROGRESS NOTES
"Assessment/Plan:      Diagnoses and all orders for this visit:    Mass of lower outer quadrant of right breast  -     Cancel: Mammo diagnostic right w 3d and cad; Future  -     US breast right limited (diagnostic); Future  -     Mammo diagnostic bilateral w 3d and cad; Future          Subjective:     Patient ID: Leeanna Sanabria is a 59 y.o. female.    Pt presents for a follow up skin/breast infection  She is s/p abx and feels 75% better, but not 100%  Pt states that she has been dealing with this \"recurrent boil\" off and on x several years  It occ flares and drains, but never goes away completely  She does have h/o bx, but in left breast  This skin change is present on her right breast  Her last mammo was ~ 5 years ago--encouraged pt to go for imaging at this time        Review of Systems   Constitutional:  Negative for chills, fever and unexpected weight change.   HENT:  Negative for ear pain and sore throat.    Eyes:  Negative for pain and visual disturbance.   Respiratory:  Negative for cough and shortness of breath.    Cardiovascular:  Negative for chest pain and palpitations.   Gastrointestinal:  Negative for abdominal pain, blood in stool, constipation, diarrhea and vomiting.   Genitourinary: Negative.  Negative for dysuria and hematuria.   Musculoskeletal:  Negative for arthralgias and back pain.   Skin:  Negative for color change and rash.   Neurological:  Negative for seizures and syncope.   All other systems reviewed and are negative.        Objective:     Physical Exam  Vitals and nursing note reviewed.   Constitutional:       Appearance: She is well-developed.   HENT:      Head: Normocephalic and atraumatic.   Chest:   Breasts:     Right: Skin change present. No inverted nipple, mass or nipple discharge.      Left: Normal. No inverted nipple, mass, nipple discharge or skin change.          Comments: Still with erythema ~ 600 right breast--just undernipple  Area looks improved/smaller s/p abx but " not resolved completely  No active bleeding or drainage  No mass  Abdominal:      Palpations: Abdomen is soft.   Genitourinary:     Exam position: Supine.      Labia:         Right: No rash, tenderness or lesion.         Left: No rash, tenderness or lesion.       Vagina: Normal.      Cervix: No cervical motion tenderness, discharge or friability.      Adnexa:         Right: No mass, tenderness or fullness.          Left: No mass, tenderness or fullness.     Musculoskeletal:      Cervical back: Normal range of motion.   Lymphadenopathy:      Lower Body: No right inguinal adenopathy. No left inguinal adenopathy.

## 2024-12-11 ENCOUNTER — TELEPHONE (OUTPATIENT)
Age: 59
End: 2024-12-11

## 2024-12-11 NOTE — TELEPHONE ENCOUNTER
Patient called in to inquire if labs ordered by surgeon could be done in office at time of OV tomorrow 12/12. RN advised they could not. If information is different, please reach out ot patient; otherwise she will go to [facility] lab to have them completed.

## 2024-12-12 ENCOUNTER — CONSULT (OUTPATIENT)
Age: 59
End: 2024-12-12
Payer: MEDICARE

## 2024-12-12 ENCOUNTER — TELEPHONE (OUTPATIENT)
Age: 59
End: 2024-12-12

## 2024-12-12 VITALS
SYSTOLIC BLOOD PRESSURE: 144 MMHG | BODY MASS INDEX: 30.8 KG/M2 | HEIGHT: 64 IN | DIASTOLIC BLOOD PRESSURE: 82 MMHG | RESPIRATION RATE: 16 BRPM | HEART RATE: 62 BPM | TEMPERATURE: 97 F | WEIGHT: 180.4 LBS

## 2024-12-12 DIAGNOSIS — M19.012 ARTHRITIS OF LEFT GLENOHUMERAL JOINT: Primary | ICD-10-CM

## 2024-12-12 DIAGNOSIS — G43.E09 CHRONIC MIGRAINE WITH AURA WITHOUT STATUS MIGRAINOSUS, NOT INTRACTABLE: ICD-10-CM

## 2024-12-12 DIAGNOSIS — D69.6 THROMBOCYTOPENIA (HCC): ICD-10-CM

## 2024-12-12 DIAGNOSIS — F41.9 ANXIETY: ICD-10-CM

## 2024-12-12 DIAGNOSIS — E11.9 CONTROLLED TYPE 2 DIABETES MELLITUS WITHOUT COMPLICATION, WITHOUT LONG-TERM CURRENT USE OF INSULIN (HCC): ICD-10-CM

## 2024-12-12 DIAGNOSIS — E78.2 MIXED HYPERLIPIDEMIA: ICD-10-CM

## 2024-12-12 DIAGNOSIS — I10 ESSENTIAL HYPERTENSION: ICD-10-CM

## 2024-12-12 DIAGNOSIS — Z01.818 PRE-OP EXAMINATION: ICD-10-CM

## 2024-12-12 DIAGNOSIS — F41.8 MIXED ANXIETY DEPRESSIVE DISORDER: ICD-10-CM

## 2024-12-12 DIAGNOSIS — F17.200 TOBACCO DEPENDENCE SYNDROME: ICD-10-CM

## 2024-12-12 LAB — SL AMB POCT HEMOGLOBIN AIC: 6.8 (ref ?–6.5)

## 2024-12-12 PROCEDURE — 93000 ELECTROCARDIOGRAM COMPLETE: CPT | Performed by: INTERNAL MEDICINE

## 2024-12-12 PROCEDURE — 83036 HEMOGLOBIN GLYCOSYLATED A1C: CPT | Performed by: INTERNAL MEDICINE

## 2024-12-12 PROCEDURE — 99214 OFFICE O/P EST MOD 30 MIN: CPT | Performed by: INTERNAL MEDICINE

## 2024-12-12 RX ORDER — HYDROXYZINE HYDROCHLORIDE 25 MG/1
25 TABLET, FILM COATED ORAL EVERY 6 HOURS PRN
Qty: 30 TABLET | Refills: 1 | Status: SHIPPED | OUTPATIENT
Start: 2024-12-12

## 2024-12-12 NOTE — ASSESSMENT & PLAN NOTE
LDL elevated on April blood work  Needs updated FLP, consider statin if continues to remain elevated.

## 2024-12-12 NOTE — ASSESSMENT & PLAN NOTE
Lab Results   Component Value Date    HGBA1C 6.8 (A) 12/12/2024   Diabetes are well-controlled  Continue current medications  Follow-up in 2 months, encouraged carb controlled diet

## 2024-12-12 NOTE — PROGRESS NOTES
Pre-operative Clearance  Name: Leeanna Sanabria      : 1965      MRN: [id number]  Encounter Provider: Nicholas Stack MD  Encounter Date: 2024   Encounter department: [facility] PRIMARY CARE    Assessment & Plan  Arthritis of left glenohumeral joint  Patient is planned for a left reverse total shoulder arthroplasty on  by orthopedic doctor Zoya  Patient is yet to complete preoperative blood work, counseled to complete  now  Previous labs are from 2024 reviewed evidence of thrombocytopenia, recheck now           Essential hypertension   well-controlled  Continue current medications, follow-up in 3 months, encourage DASH diet.       Chronic migraine with aura without status migrainosus, not intractable  Stable  Continue current medication, will reevaluate at next office visit       Controlled type 2 diabetes mellitus without complication, without long-term current use of insulin (HCC)    Lab Results   Component Value Date    HGBA1C 6.8 (A) 2024   Diabetes are well-controlled  Continue current medications  Follow-up in 2 months, encouraged carb controlled diet         Mixed anxiety depressive disorder  Stable, improved with Atarax  Patient needs refills of medications  Follow-up in the next office visit         Tobacco dependence syndrome  Quit smoking in the last 3 weeks  Encouraged continued compliance with smoking cessation  Plan for LDCT         Mixed hyperlipidemia  LDL elevated on April blood work  Needs updated FLP, consider statin if continues to remain elevated.         Anxiety  Able, continue current medication, follow-up in 2 months  Orders:    hydrOXYzine HCL (ATARAX) 25 mg tablet; Take 1 tablet (25 mg total) by mouth every 6 (six) hours as needed for itching    Pre-op examination    Orders:    POCT hemoglobin A1c    POCT ECG    Thrombocytopenia (HCC)  As evident on recent blood work in 2024, patient needs to complete a preoperative  evaluation.  Pending preoperative blood work patient is cleared for proposed surgery       Pre-operative Clearance:     Clearance:  Patient is medically optimized (CLEARED) for proposed surgery without any additional cardiac testing.      Medication Instructions:   - Avoid herbs or non-directed vitamins one week prior to surgery    - Avoid aspirin containing medications or non-steroidal anti-inflammatory drugs one week preceding surgery    - ACE Inhibitors or ARBs: Continue this medication up to the evening before surgery/procedure, but do not take the morning of the day of surgery.  - Antiepileptic meds: Continue to take this medication on your normal schedule.      Medicine Instructions for Adults with Diabetes (NO Bowel Prep)    Follow these instructions when a BOWEL PREP is NOT required for your procedure or surgery!    NOTE:  GLP Agonists taken weekly: do not take in the 7 days before your procedure. **Bariatric surgery: do not take 4 weeks prior to your procedure.    SGLT-2 Inhibitors: do not take in the 4 days before your procedure    On the Day Before Surgery/Procedure  If you are having a procedure (e.g., Colonoscopy) or surgery which DOES NOT require a bowel prep, follow the directions below based on the type of medicine you take for your diabetes.  Type of Medicine You Take Examples What to Do   Pre-Mixed Insulin Intermediate  Dlidhxw13/25, Xjlorwl26/30, Novolog 70/30, Regular Insulin Take 1/2 your regular dose the evening before our procedure.   Rapid/Fast Acting  Insulin and/or Long-Acting Insulin Humalog U200, NovoLog, Apidra,  Lantus, Levemir, Tresiba, Toujeo,  Fias, Basaglar Take your FULL regular dose the day before procedure.   Oral Diabetic Medicines (sulfonylurea) Glipizide/Glimepiride/  Glucotrol Take your regular dose with dinner the evening before your procedure.   Other Oral Diabetic Medicines Metformin, Glucophage, Glucophage  XR, Riomet, Glumetza, Actose,  Avandia, Gl set, Prandin Take your  regular dose with dinner the evening before your procedure   GLP Agonists Adlyxin, Byetta, Bydureon,  Ozempic, Soliqua, Tanzeum,  Trulicity, Victoza, Saxenda,  Rybelsus, Wegovy, Mounjaro, Zepbound If taken daily, take as normal  If taken weekly, do not take this medicine for 7 days before your procedure including the day of the procedure (resume taking after the procedure). **Bariatric surgery: do not take 4 weeks prior to procedure   SGLT-2 Inhibitors Jardiance, Invokana, Farxiga, Steglatro, Brenzavvy, Qtern, Segluromet Glyxambi, Synjardy, Synjardy XR, Invokamet, InvokametXR, Trijary XR, Xigduo X Do not take for 4 days before your procedure including the day of the procedure (resume taking after the procedure)   This educational material has been approved by the Patient Education Advisory Committee.    On the Day of Surgery/Procedure  Follow the directions below based on the type of medicine you take for your diabetes.  Type of Medicine You Take  Examples What to Do   Long-Acting Insulin Lantus, Levemir, Tresiba,  Toujeo, Basaglar, Semglee If you normally take your Long Acting Insulin in the morning, take the full dose as scheduled.   GLP-I Agonists Adlyxin, Byetta, Bydureon,  Ozempic, Soliqua, Tanzeum,  Trulicity, Victoza, Saxenda,  Rybelsus, Mounjaro Do NOT take this medicine on the day of your procedure (resume taking after the procedure)   Except for the morning Long-Acting Insulin, DO NOT take ANY diabetic medicine on the day of your procedure unless you were instructed by the doctor who manages your diabetes medicines.  Continue to check your blood sugars.  If you have an insulin pump, ask your endocrinologist for instructions at least 3 days before your procedure. NOTE: If you are not able to ask your endocrinologist in advance, on the day of the procedure set your insulin pump to your basal rate only. Bring your insulin pump supplies to the hospital.         History of Present Illness     HPI  Review of  Systems   Constitutional:  Negative for appetite change, chills, diaphoresis, fatigue, fever and unexpected weight change.   Respiratory:  Negative for apnea, cough, choking, chest tightness, shortness of breath, wheezing and stridor.    Cardiovascular:  Negative for chest pain, palpitations and leg swelling.   Gastrointestinal:  Negative for abdominal distention, abdominal pain, anal bleeding, blood in stool, constipation, diarrhea, nausea and vomiting.   Genitourinary:  Negative for decreased urine volume, difficulty urinating, frequency and urgency.   Musculoskeletal:  Negative for arthralgias, back pain and myalgias.   Neurological:  Negative for dizziness, light-headedness, numbness and headaches.     Past Medical History   Past Medical History:   Diagnosis Date    Diabetes mellitus (HCC)     Hypertension     Psychiatric disorder     depression     History reviewed. No pertinent surgical history.  Family History   Problem Relation Age of Onset    Breast cancer Neg Hx     Colon cancer Neg Hx     Ovarian cancer Neg Hx      Social History     Tobacco Use    Smoking status: Every Day     Types: Cigarettes     Passive exposure: Current    Smokeless tobacco: Never   Vaping Use    Vaping status: Never Used   Substance and Sexual Activity    Alcohol use: Yes     Comment: occasionally    Drug use: Never    Sexual activity: Not Currently     Partners: Male     Birth control/protection: Post-menopausal     Comment:      Current Outpatient Medications on File Prior to Visit   Medication Sig    aspirin 81 mg chewable tablet Chew    calcium-vitamin D (Oscal 500/200 D-3) 500 mg-200 units per tablet Take by mouth    Cyanocobalamin (VITAMIN B 12 PO) Take by mouth    ketoconazole (NIZORAL) 2 % shampoo Use daily for two weeks then use 3 times a week    losartan (COZAAR) 50 mg tablet Take 1 tablet (50 mg total) by mouth daily    metFORMIN (GLUCOPHAGE) 1000 MG tablet Take 1 tablet (1,000 mg total) by mouth 2 (two) times a  "day with meals    Multiple Vitamin (multivitamin) tablet Take 1 tablet by mouth daily    Omega-3 Fatty Acids (FISH OIL PO) Take by mouth    pantoprazole (PROTONIX) 40 mg tablet TAKE 1 TABLET BY MOUTH DAILY    rimegepant sulfate (Nurtec) 75 mg TBDP Take one NURTEC 75 mg at onset under tongue. Limit 1 in 24 hours    topiramate (TOPAMAX) 25 mg tablet 1 tab PO QHS for 1 week, increase as tolerated to 1 tab BID for 1 week, then 1 tab QAM and 2 tabs QHS for 1 week and finish at 2 tabs BID.    [DISCONTINUED] hydrOXYzine HCL (ATARAX) 25 mg tablet Take 1 tablet (25 mg total) by mouth every 6 (six) hours as needed for itching     Allergies   Allergen Reactions    Lisinopril Cough    Monosodium Glutamate - Food Allergy Palpitations    Other Palpitations     Pt is allergic to MSG, says she gets a fast heart rate and palpitations     Objective   /82 (BP Location: Left arm, Patient Position: Sitting, Cuff Size: Large)   Pulse 62   Temp (!) 97 °F (36.1 °C) (Tympanic)   Resp 16   Ht 5' 4.25\" (1.632 m)   Wt 81.8 kg (180 lb 6.4 oz)   BMI 30.73 kg/m²     Physical Exam  Constitutional:       General: She is not in acute distress.     Appearance: Normal appearance. She is normal weight. She is not ill-appearing, toxic-appearing or diaphoretic.   Cardiovascular:      Rate and Rhythm: Normal rate and regular rhythm.      Pulses: Normal pulses.      Heart sounds: Normal heart sounds. No murmur heard.     No gallop.   Pulmonary:      Effort: Pulmonary effort is normal. No respiratory distress.      Breath sounds: Normal breath sounds. No stridor. No wheezing, rhonchi or rales.   Chest:      Chest wall: No tenderness.   Musculoskeletal:      Right lower leg: No edema.      Left lower leg: No edema.   Neurological:      Mental Status: She is alert and oriented to person, place, and time.           Nicholas Stack MD  "

## 2024-12-12 NOTE — ASSESSMENT & PLAN NOTE
Stable, improved with Atarax  Patient needs refills of medications  Follow-up in the next office visit

## 2024-12-12 NOTE — TELEPHONE ENCOUNTER
Received a fax sheet from [facility] patient got DM eye exam 9/24/2024.     Scanned into chart and sent a message to care gap team.

## 2024-12-12 NOTE — ASSESSMENT & PLAN NOTE
Quit smoking in the last 3 weeks  Encouraged continued compliance with smoking cessation  Plan for LDCT

## 2024-12-13 ENCOUNTER — TELEPHONE (OUTPATIENT)
Dept: ADMINISTRATIVE | Facility: OTHER | Age: 59
End: 2024-12-13

## 2024-12-13 ENCOUNTER — HOSPITAL ENCOUNTER (OUTPATIENT)
Dept: MAMMOGRAPHY | Facility: CLINIC | Age: 59
Discharge: HOME | End: 2024-12-13
Payer: MEDICARE

## 2024-12-13 ENCOUNTER — HOSPITAL ENCOUNTER (OUTPATIENT)
Dept: ULTRASOUND IMAGING | Facility: CLINIC | Age: 59
Discharge: HOME | End: 2024-12-13
Payer: MEDICARE

## 2024-12-13 VITALS — HEIGHT: 64 IN | BODY MASS INDEX: 30.73 KG/M2 | WEIGHT: 180 LBS

## 2024-12-13 DIAGNOSIS — N63.13 MASS OF LOWER OUTER QUADRANT OF RIGHT BREAST: ICD-10-CM

## 2024-12-13 PROCEDURE — G0279 TOMOSYNTHESIS, MAMMO: HCPCS

## 2024-12-13 PROCEDURE — 76642 ULTRASOUND BREAST LIMITED: CPT

## 2024-12-13 PROCEDURE — 77066 DX MAMMO INCL CAD BI: CPT

## 2024-12-13 NOTE — TELEPHONE ENCOUNTER
----- Message from Jillian FONTAINE sent at 12/12/2024 12:26 PM EST -----  Regarding: care gap request  12/12/24 12:26 PM    Hello, our patient attached above has had Diabetic Eye Exam completed/performed. Please assist in updating the patient chart by pulling the document from the Media Tab. The date of service is 12/12/2024.     Thank you,  ALVNI Duarte PG, RD PRIMARY CARE

## 2024-12-13 NOTE — PROGRESS NOTES
Met with patient and   regarding recommendation for;    ____x_ RIGHT ___x___LEFT      ____x_Ultrasound guided  ____x__Stereotactic breast biopsy.      __X___Verbalized understanding.    Reviewed clip placement with patient, pt states understanding: Yes: x____ No: ____  Comments:    Blood thinners:  No: _____ Yes: ______ What:   ASA 81 mg       Biopsy teaching sheet given:  Yes: ___X___ No: ________    Pt given contact information and adv to call with any questions/needs    Patient advised to arrive at 8:30... for a .9:00.. appointment

## 2024-12-13 NOTE — LETTER
Diabetic Eye Exam Form    Date Requested: 24  Patient: Leeanna Sanabria  Patient : 1965   Referring Provider: Nicholas Stack MD      DIABETIC Eye Exam Date _______________________________      Type of Exam MUST be documented for Diabetic Eye Exams. Please CHECK ONE.     Retinal Exam       Dilated Retinal Exam       OCT       Optomap-Iris Exam      Fundus Photography       Left Eye - Please check Retinopathy or No Retinopathy        Exam did show retinopathy    Exam did not show retinopathy       Right Eye - Please check Retinopathy or No Retinopathy       Exam did show retinopathy    Exam did not show retinopathy       Comments __________________________________________________________    Practice Providing Exam ______________________________________________    Exam Performed By (print name) _______________________________________      Provider Signature ___________________________________________________      These reports are needed for  compliance.  Please fax this completed form and a copy of the Diabetic Eye Exam report to our office located at [address] [place] [address] as soon as possible via Fax [phone] baylee Cruz: Phone [phone]  We thank you for your assistance in treating our mutual patient.

## 2024-12-16 ENCOUNTER — TELEPHONE (OUTPATIENT)
Dept: OBGYN CLINIC | Facility: CLINIC | Age: 59
End: 2024-12-16

## 2024-12-16 ENCOUNTER — RESULTS FOLLOW-UP (OUTPATIENT)
Age: 59
End: 2024-12-16

## 2024-12-16 NOTE — TELEPHONE ENCOUNTER
Upon review of the In Basket request and the patient's chart, initial outreach has been made via fax to facility. Please see Contacts section for details. Report in media does not have complete information.     Thank you  Nancy Wheeler MA

## 2024-12-16 NOTE — TELEPHONE ENCOUNTER
----- Message from Nicholas Stack MD sent at 12/16/2024  8:15 AM EST -----  Recent breast ultrasound shows right-sided breast mass and a left breast calcification.  Patient  has been scheduled for a breast biopsy which should be completed.  Patient also did not get her preop labs completed.  Please let the patient know

## 2024-12-17 ENCOUNTER — TELEPHONE (OUTPATIENT)
Dept: OBGYN CLINIC | Facility: CLINIC | Age: 59
End: 2024-12-17

## 2024-12-19 NOTE — TELEPHONE ENCOUNTER
Upon review of the In Basket request we were able to note that no further action is required. The patient chart is up to date as a result of a previous request.  Last diabetic eye exam was 9/24/24.     Any additional questions or concerns should be emailed to the Practice Liaisons via the appropriate education email address, please do not reply via In Basket.    Thank you  Nancy Wheeler MA   [facility]

## 2024-12-23 ENCOUNTER — TELEPHONE (OUTPATIENT)
Age: 59
End: 2024-12-23

## 2024-12-23 NOTE — TELEPHONE ENCOUNTER
Patient has breast biopsy scheduled Jan 23.  Patient states she is on wait list. We discussed the labs that need to be complete prior. Patient verbalzied understanding  Patient received a [facility]  form letter today- dated the day she had mammo and imaging and she verbalized that this seemed odd to her after speaking with provider and already  told she had right sided breast mass and left breast calcifications. We discussed this is formal notification and all patients received mammo report follow up.   Patient also verbalized her frustration with scheduling f/u biopsy. Patient wanted provider to know she is OK but feeling overwhelmed at times. She is contact with PCP as well.

## 2025-01-02 ENCOUNTER — TELEPHONE (OUTPATIENT)
Dept: NEUROLOGY | Facility: CLINIC | Age: 60
End: 2025-01-02

## 2025-01-10 ENCOUNTER — TELEPHONE (OUTPATIENT)
Age: 60
End: 2025-01-10

## 2025-01-10 NOTE — TELEPHONE ENCOUNTER
LM providing information in mailbox    No blood work is needed for MOHS procedure patient is not on any rx'd medication that would need INR checked.     She mentioned she keeps getting phone calls to have blood work done, phone calls could be from another department.

## 2025-01-10 NOTE — TELEPHONE ENCOUNTER
Patient called stating she has an upcoming Mohs procedure on 1/24/25 and was wondering if any blood work is needed before hand, she said she keeps getting these calls that she needs to get blood work done, advised patient that it doesn't look like she needs any done for us but that I would double check with the team. Call back number [phone]

## 2025-01-13 ENCOUNTER — TELEPHONE (OUTPATIENT)
Dept: LAB | Facility: HOSPITAL | Age: 60
End: 2025-01-13

## 2025-01-14 ENCOUNTER — APPOINTMENT (OUTPATIENT)
Dept: LAB | Facility: HOSPITAL | Age: 60
End: 2025-01-14
Payer: MEDICARE

## 2025-01-14 DIAGNOSIS — M19.012 OSTEOARTHRITIS OF GLENOHUMERAL JOINT, LEFT: ICD-10-CM

## 2025-01-14 DIAGNOSIS — M87.00 AVN (AVASCULAR NECROSIS OF BONE) (HCC): ICD-10-CM

## 2025-01-14 DIAGNOSIS — M25.512 LEFT SHOULDER PAIN, UNSPECIFIED CHRONICITY: ICD-10-CM

## 2025-01-14 DIAGNOSIS — E55.9 VITAMIN D DEFICIENCY: ICD-10-CM

## 2025-01-14 DIAGNOSIS — Z11.59 NEED FOR HEPATITIS C SCREENING TEST: ICD-10-CM

## 2025-01-14 DIAGNOSIS — I10 ESSENTIAL HYPERTENSION: ICD-10-CM

## 2025-01-14 DIAGNOSIS — E78.2 MIXED HYPERLIPIDEMIA: ICD-10-CM

## 2025-01-14 DIAGNOSIS — E11.9 CONTROLLED TYPE 2 DIABETES MELLITUS WITHOUT COMPLICATION, WITHOUT LONG-TERM CURRENT USE OF INSULIN (HCC): ICD-10-CM

## 2025-01-14 LAB
25(OH)D3 SERPL-MCNC: 18.7 NG/ML (ref 30–100)
ALBUMIN SERPL BCG-MCNC: 4.2 G/DL (ref 3.5–5)
ALP SERPL-CCNC: 98 U/L (ref 34–104)
ALT SERPL W P-5'-P-CCNC: 53 U/L (ref 7–52)
ANION GAP SERPL CALCULATED.3IONS-SCNC: 10 MMOL/L (ref 4–13)
AST SERPL W P-5'-P-CCNC: 66 U/L (ref 13–39)
BASOPHILS # BLD AUTO: 0.04 THOUSANDS/ΜL (ref 0–0.1)
BASOPHILS NFR BLD AUTO: 1 % (ref 0–1)
BILIRUB SERPL-MCNC: 1.54 MG/DL (ref 0.2–1)
BILIRUB UR QL STRIP: NEGATIVE
BUN SERPL-MCNC: 9 MG/DL (ref 5–25)
CALCIUM SERPL-MCNC: 9.5 MG/DL (ref 8.4–10.2)
CHLORIDE SERPL-SCNC: 100 MMOL/L (ref 96–108)
CHOLEST SERPL-MCNC: 212 MG/DL (ref ?–200)
CLARITY UR: NORMAL
CO2 SERPL-SCNC: 25 MMOL/L (ref 21–32)
COLOR UR: YELLOW
CREAT SERPL-MCNC: 0.73 MG/DL (ref 0.6–1.3)
CREAT UR-MCNC: 60.6 MG/DL
EOSINOPHIL # BLD AUTO: 0.1 THOUSAND/ΜL (ref 0–0.61)
EOSINOPHIL NFR BLD AUTO: 2 % (ref 0–6)
ERYTHROCYTE [DISTWIDTH] IN BLOOD BY AUTOMATED COUNT: 12.9 % (ref 11.6–15.1)
EST. AVERAGE GLUCOSE BLD GHB EST-MCNC: 143 MG/DL
GFR SERPL CREATININE-BSD FRML MDRD: 90 ML/MIN/1.73SQ M
GLUCOSE P FAST SERPL-MCNC: 172 MG/DL (ref 65–99)
GLUCOSE UR STRIP-MCNC: NEGATIVE MG/DL
HBA1C MFR BLD: 6.6 %
HCT VFR BLD AUTO: 43.4 % (ref 34.8–46.1)
HDLC SERPL-MCNC: 58 MG/DL
HGB BLD-MCNC: 13.5 G/DL (ref 11.5–15.4)
HGB UR QL STRIP.AUTO: NEGATIVE
IMM GRANULOCYTES # BLD AUTO: 0.02 THOUSAND/UL (ref 0–0.2)
IMM GRANULOCYTES NFR BLD AUTO: 0 % (ref 0–2)
INR PPP: 0.99 (ref 0.85–1.19)
KETONES UR STRIP-MCNC: NEGATIVE MG/DL
LDLC SERPL CALC-MCNC: 108 MG/DL (ref 0–100)
LEUKOCYTE ESTERASE UR QL STRIP: NEGATIVE
LYMPHOCYTES # BLD AUTO: 2.84 THOUSANDS/ΜL (ref 0.6–4.47)
LYMPHOCYTES NFR BLD AUTO: 42 % (ref 14–44)
MCH RBC QN AUTO: 30.7 PG (ref 26.8–34.3)
MCHC RBC AUTO-ENTMCNC: 31.1 G/DL (ref 31.4–37.4)
MCV RBC AUTO: 99 FL (ref 82–98)
MICROALBUMIN UR-MCNC: <7 MG/L
MONOCYTES # BLD AUTO: 0.71 THOUSAND/ΜL (ref 0.17–1.22)
MONOCYTES NFR BLD AUTO: 11 % (ref 4–12)
NEUTROPHILS # BLD AUTO: 3.01 THOUSANDS/ΜL (ref 1.85–7.62)
NEUTS SEG NFR BLD AUTO: 44 % (ref 43–75)
NITRITE UR QL STRIP: NEGATIVE
NRBC BLD AUTO-RTO: 0 /100 WBCS
PH UR STRIP.AUTO: 6.5 [PH]
PLATELET # BLD AUTO: 156 THOUSANDS/UL (ref 149–390)
PMV BLD AUTO: 10.1 FL (ref 8.9–12.7)
POTASSIUM SERPL-SCNC: 4.9 MMOL/L (ref 3.5–5.3)
PROT SERPL-MCNC: 7.4 G/DL (ref 6.4–8.4)
PROT UR STRIP-MCNC: NEGATIVE MG/DL
PROTHROMBIN TIME: 13.8 SECONDS (ref 12.3–15)
RBC # BLD AUTO: 4.4 MILLION/UL (ref 3.81–5.12)
SODIUM SERPL-SCNC: 135 MMOL/L (ref 135–147)
SP GR UR STRIP.AUTO: 1.01 (ref 1–1.03)
TRIGL SERPL-MCNC: 229 MG/DL (ref ?–150)
UROBILINOGEN UR STRIP-ACNC: <2 MG/DL
WBC # BLD AUTO: 6.72 THOUSAND/UL (ref 4.31–10.16)

## 2025-01-14 PROCEDURE — 36415 COLL VENOUS BLD VENIPUNCTURE: CPT

## 2025-01-14 PROCEDURE — 81003 URINALYSIS AUTO W/O SCOPE: CPT

## 2025-01-14 PROCEDURE — 83036 HEMOGLOBIN GLYCOSYLATED A1C: CPT

## 2025-01-14 PROCEDURE — 80061 LIPID PANEL: CPT

## 2025-01-14 PROCEDURE — 85025 COMPLETE CBC W/AUTO DIFF WBC: CPT

## 2025-01-14 PROCEDURE — 82570 ASSAY OF URINE CREATININE: CPT

## 2025-01-14 PROCEDURE — 86803 HEPATITIS C AB TEST: CPT

## 2025-01-14 PROCEDURE — 82043 UR ALBUMIN QUANTITATIVE: CPT

## 2025-01-14 PROCEDURE — 85610 PROTHROMBIN TIME: CPT

## 2025-01-14 PROCEDURE — 82306 VITAMIN D 25 HYDROXY: CPT

## 2025-01-14 PROCEDURE — 80053 COMPREHEN METABOLIC PANEL: CPT

## 2025-01-15 ENCOUNTER — RESULTS FOLLOW-UP (OUTPATIENT)
Age: 60
End: 2025-01-15

## 2025-01-15 ENCOUNTER — NURSE TRIAGE (OUTPATIENT)
Age: 60
End: 2025-01-15

## 2025-01-15 DIAGNOSIS — E55.9 VITAMIN D DEFICIENCY: Primary | ICD-10-CM

## 2025-01-15 LAB — HCV AB SER QL: NORMAL

## 2025-01-15 RX ORDER — ERGOCALCIFEROL 1.25 MG/1
50000 CAPSULE, LIQUID FILLED ORAL WEEKLY
Qty: 8 CAPSULE | Refills: 0 | Status: SHIPPED | OUTPATIENT
Start: 2025-01-15 | End: 2025-03-06

## 2025-01-15 NOTE — TELEPHONE ENCOUNTER
"Patient called office c/o vaginal itching inside of vagina, urinary frequency, urinating small amounts. She states symptoms started 4 days ago. She denies discharge, bleeding, pain with urination, abdominal pain, back pian, fever, or any other symptoms to note at this time. Patient had UA w Reflex to Microscopic w Reflex to Culture collected yesterday. Will route to provider for further advice at this time. Advised she call back with worsening symptoms. Patient states she would prefer not to come to office and would like medication ordered.           Reason for Disposition   Mild vaginal itching    Answer Assessment - Initial Assessment Questions  1. SYMPTOM: \"What's the main symptom you're concerned about?\" (e.g., pain, itching, dryness)     Vaginal itching, urinary frequency, small amounts of urine when urinating  2. LOCATION: \"Where is the  symptoms located?\" (e.g., inside/outside, left/right)      Inside of vagina   3. ONSET: \"When did the  symptoms  start?\"      4 days ago   4. PAIN: \"Is there any pain?\" If Yes, ask: \"How bad is it?\" (Scale: 1-10; mild, moderate, severe)      No   5. ITCHING: \"Is there any itching?\" If Yes, ask: \"How bad is it?\" (Scale: 1-10; mild, moderate, severe)      3  6. CAUSE: \"What do you think is causing the discharge?\" \"Have you had the same problem before?\" \"What happened then?\"      None   7. OTHER SYMPTOMS: \"Do you have any other symptoms?\" (e.g., fever, itching, vaginal bleeding, pain with urination, injury to genital area, vaginal foreign body)      None    Protocols used: Vaginal Symptoms-Adult-[place]    "

## 2025-01-15 NOTE — TELEPHONE ENCOUNTER
Spoke with patient in regards blood test, she stated she would like discuss more in detail at her upcoming visit on Friday with .

## 2025-01-16 ENCOUNTER — TELEPHONE (OUTPATIENT)
Age: 60
End: 2025-01-16

## 2025-01-16 DIAGNOSIS — B37.31 YEAST VAGINITIS: Primary | ICD-10-CM

## 2025-01-16 DIAGNOSIS — N89.8 VAGINA ITCHING: ICD-10-CM

## 2025-01-16 RX ORDER — TERCONAZOLE 4 MG/G
1 CREAM VAGINAL
Qty: 45 G | Refills: 1 | Status: SHIPPED | OUTPATIENT
Start: 2025-01-16

## 2025-01-16 RX ORDER — FLUCONAZOLE 150 MG/1
150 TABLET ORAL ONCE
Qty: 1 TABLET | Refills: 1 | Status: SHIPPED | OUTPATIENT
Start: 2025-01-16 | End: 2025-01-16

## 2025-01-16 NOTE — TELEPHONE ENCOUNTER
PA for terconazole (TERAZOL 7) 0.4 % SUBMITTED to [employer]    via    [x]CMM-KEY: [id number]  []Surescripts-Case ID #   []Availity-Auth ID # NDC #   []Faxed to plan   []Other website   []Phone call Case ID #     []PA sent as URGENT    All office notes, labs and other pertaining documents and studies sent. Clinical questions answered. Awaiting determination from insurance company.     Turnaround time for your insurance to make a decision on your Prior Authorization can take 7-21 business days.

## 2025-01-22 ENCOUNTER — TELEPHONE (OUTPATIENT)
Age: 60
End: 2025-01-22

## 2025-01-22 DIAGNOSIS — I10 HYPERTENSION, UNSPECIFIED TYPE: ICD-10-CM

## 2025-01-22 DIAGNOSIS — G43.E09 CHRONIC MIGRAINE WITH AURA WITHOUT STATUS MIGRAINOSUS, NOT INTRACTABLE: ICD-10-CM

## 2025-01-22 DIAGNOSIS — Z87.19 H/O GASTROESOPHAGEAL REFLUX (GERD): ICD-10-CM

## 2025-01-22 DIAGNOSIS — F41.9 ANXIETY: ICD-10-CM

## 2025-01-22 RX ORDER — PANTOPRAZOLE SODIUM 40 MG/1
40 TABLET, DELAYED RELEASE ORAL DAILY
Qty: 30 TABLET | Refills: 0 | Status: SHIPPED | OUTPATIENT
Start: 2025-01-22

## 2025-01-22 RX ORDER — HYDROXYZINE HYDROCHLORIDE 25 MG/1
25 TABLET, FILM COATED ORAL EVERY 6 HOURS PRN
Qty: 30 TABLET | Refills: 0 | Status: SHIPPED | OUTPATIENT
Start: 2025-01-22

## 2025-01-22 RX ORDER — LOSARTAN POTASSIUM 50 MG/1
50 TABLET ORAL DAILY
Qty: 90 TABLET | Refills: 0 | Status: SHIPPED | OUTPATIENT
Start: 2025-01-22

## 2025-01-22 NOTE — TELEPHONE ENCOUNTER
Reason for call:   [x] Refill   [] Prior Auth  [] Other:     Office:   [] PCP/Provider -   [x] Specialty/Provider - Neurology    Medication: topiramate (TOPAMAX) 25 mg tablet    Dose/Frequency: 1 tab PO QHS for 1 week, increase as tolerated to 1 tab BID for 1 week, then 1 tab QAM and 2 tabs QHS for 1 week and finish at 2 tabs BID     Quantity: 120 tablet    Pharmacy: [facility] ([place]) - NANCY Kumar -     Does the patient have enough for 3 days?   [x] Yes   [] No - Send as HP to POD

## 2025-01-22 NOTE — TELEPHONE ENCOUNTER
Reason for call:   [x] Refill   [] Prior Auth  [] Other:     Office:   [x] PCP/Provider -   [] Specialty/Provider -     Medication:     hydrOXYzine HCL (ATARAX) 25 mg tablet     losartan (COZAAR) 50 mg tablet    pantoprazole (PROTONIX) 40 mg tablet       Pharmacy: [facility]) - NANCY Kumar -     Does the patient have enough for 3 days?   [x] Yes   [] No - Send as HP to POD

## 2025-01-23 ENCOUNTER — HOSPITAL ENCOUNTER (OUTPATIENT)
Dept: MAMMOGRAPHY | Facility: CLINIC | Age: 60
Discharge: HOME | End: 2025-01-23

## 2025-01-23 RX ORDER — TOPIRAMATE 25 MG/1
50 TABLET, FILM COATED ORAL 2 TIMES DAILY
Qty: 120 TABLET | Refills: 6 | Status: SHIPPED | OUTPATIENT
Start: 2025-01-23

## 2025-01-24 ENCOUNTER — PROCEDURE VISIT (OUTPATIENT)
Dept: DERMATOLOGY | Facility: CLINIC | Age: 60
End: 2025-01-24
Payer: MEDICARE

## 2025-01-24 VITALS
WEIGHT: 175 LBS | HEIGHT: 64 IN | SYSTOLIC BLOOD PRESSURE: 122 MMHG | TEMPERATURE: 98.4 F | HEART RATE: 105 BPM | DIASTOLIC BLOOD PRESSURE: 78 MMHG | OXYGEN SATURATION: 99 % | BODY MASS INDEX: 29.88 KG/M2

## 2025-01-24 DIAGNOSIS — C44.91 BASAL CELL CARCINOMA (BCC), UNSPECIFIED SITE: ICD-10-CM

## 2025-01-24 PROCEDURE — 17312 MOHS ADDL STAGE: CPT | Performed by: DERMATOLOGY

## 2025-01-24 PROCEDURE — 17311 MOHS 1 STAGE H/N/HF/G: CPT | Performed by: DERMATOLOGY

## 2025-01-24 NOTE — PATIENT INSTRUCTIONS
"Mohs Microscopic Surgery After Care    Your wound will heal via secondary intention, which means no additional surgery was performed after removal of your skin cancer. The wound was left open to heal gradually over time using wound care alone. We did put a layer of glue on the wound to help make wound care easier as well as help with decreasing the risk of bleeding after surgery.     Wounds left to heal secondarily can take 2-3 months on average to heal.  The healing occurs step by step. You will notice that over the first three weeks the area will drain straw colored fluid that may have some blood within it. This is normal. Over the first three weeks, you form a nice healing base called fibrin that serves as the scaffold or map for the rest of your body's healing process. The fibrin is a thick yellow tissue. People often worry that it is pus given the yellow color. Unlike pus, this is a thick layer that cannot be rubbed off. After this, you should expect the wound to \"fill in\" to the surface of your skin over the course of several weeks. Lastly, a new layer of skin will form over the wound.    Until your body forms a good fibrin base (which takes ~3 weeks) you should avoid immersing the wound in water (such as in baths, whirlpools, swimming pools, hot tubs, lakes and oceans). You however CAN and should wash the area daily, as instructed below.     After healing, the scar will initially be red (and often times a deep red to purple color on the legs) but will gradually fade over the course of 1 year to to round scar lighter than the skin surrounding it.    WOUND CARE AFTER YOUR PROCEDURE    Try NOT to remove the pressure bandage for 48 hours. Keep the area clean and dry while this bandage is on.     After removing the bandage for the first time, gently clean the area with soap and water. If the bandage is difficult to remove, getting the bandage wet in the shower will sometimes help soften the adhesive and allow it " to be removed more easily.     You will now need to cleanse this area daily in the shower with gentle soap. There is no need to scrub the area. There is no need for further wound care for 2 weeks or until the glue peels off entirely. You will notice over this time that the glue will start to flake off. Do not yet apply and Vaseline or Aquaphor to the area as this will dissolve your skin glue.  If you would like to keep the area covered, non stick dressing and paper tape (or Hypafix) are recommended for sensitive skin but a bandaid is fine if it covers the area well.    After 1-2 weeks, you will need start to apply plain Vaseline ointment (this is over the counter and not a prescription) to the site until it has healed over completely followed by a clean appropriately sized bandage to area.  Non stick dressing and paper tape (or Hypafix) are recommended for sensitive skin but a bandaid is fine if it covers the area well.      We advise you follow the wound care as noted below the entire time it takes for the areas to heal completely.           MANAGING YOUR PAIN AFTER SURGERY     You can expect to have some pain after surgery. This is normal. The pain is typically worse the first two days after surgery, and quickly begins to get better.     The best strategy for controlling your pain after surgery is around the clock pain control. You can take over the counter Acetaminophen (Tylenol) for discomfort, if no contraindications.     If you are taking this at the maximum dose, you can alternate this with Motrin (ibuprofen or Advil) as well. Alternating these medications with each other allows you to maximize your pain control. In addition to Tylenol and Motrin, you can use heating pads or ice packs on your incisions to help reduce your pain.     How will I alternate your regular strength over-the-counter pain medication?  You will take a dose of pain medication every three hours.   Start by taking 650 mg of Tylenol (2  pills of 325 mg)   3 hours later take 600 mg of Motrin (3 pills of 200 mg)   3 hours after taking the Motrin take 650 mg of Tylenol   3 hours after that take 600 mg of Motrin.    See example - if your first dose of Tylenol is at 12:00 PM     12:00 PM  Tylenol 650 mg (2 pills of 325 mg)    3:00 PM  Motrin 600 mg (3 pills of 200 mg)    6:00 PM  Tylenol 650 mg (2 pills of 325 mg)    9:00 PM  Motrin 600 mg (3 pills of 200 mg)    Continue alternating every 3 hours      Important:   Do not take more than 4000mg of Tylenol or 3200mg of Motrin in a 24-hour period.     What if I still have pain?   If you have pain that is not controlled with the over-the-counter pain medications (Tylenol and Motrin or Advil), don't hesitate to call our staff using the number provided. We will help make sure you are managing your pain in the best way possible, and if necessary, we can provide a prescription for additional pain medication.     CALL OUR OFFICE IMMEDIATELY FOR ANY SIGNS OF INFECTION:    This includes fever, chills, increased redness, warmth, tenderness, severe discomfort/pain, or pus or foul smell coming from the wound. If you are experiencing any of the above, please call [facility]'s Mohs Department directly at [phone].    IF BLEEDING IS NOTICED:    Place a clean cloth over the area and apply firm pressure for thirty minutes.  Check the wound ONLY after 30 minutes of direct pressure; do not cheat and sneak a peak, as that does not count.  If bleeding persists after 30 minutes of legitimate direct pressure, then try one more round of direct pressure to the area.  Should the bleeding become heavier or not stop after the second attempt, call [facility] directly at [phone]. Your call will get routed to the dermatology surgeon on call even after hours.

## 2025-01-24 NOTE — PROGRESS NOTES
MOHS Procedure Note    Patient: Leeanna Sanabria  : 1965  MRN: [id number]  Date: 2025    History of Present Illness: The patient is a 59 y.o. female who presents with complaints of basal cell carcinoma, at least, superficial, and nodular type on posterior left ear.    Past Medical History:   Diagnosis Date    Basal cell carcinoma 2024    posterior left ear-Mohs    Diabetes mellitus (HCC)     Hypertension     Psychiatric disorder     depression       Past Surgical History:   Procedure Laterality Date    MOHS SURGERY Left 2025    BCC posterior left ear:          Current Outpatient Medications:     aspirin 81 mg chewable tablet, Chew, Disp: , Rfl:     calcium-vitamin D (Oscal 500/200 D-3) 500 mg-200 units per tablet, Take by mouth, Disp: , Rfl:     Cyanocobalamin (VITAMIN B 12 PO), Take by mouth, Disp: , Rfl:     ergocalciferol (VITAMIN D2) 50,000 units, Take 1 capsule (50,000 Units total) by mouth once a week for 8 doses, Disp: 8 capsule, Rfl: 0    hydrOXYzine HCL (ATARAX) 25 mg tablet, Take 1 tablet (25 mg total) by mouth every 6 (six) hours as needed for itching, Disp: 30 tablet, Rfl: 0    ketoconazole (NIZORAL) 2 % shampoo, Use daily for two weeks then use 3 times a week, Disp: 120 mL, Rfl: 6    losartan (COZAAR) 50 mg tablet, Take 1 tablet (50 mg total) by mouth daily, Disp: 90 tablet, Rfl: 0    metFORMIN (GLUCOPHAGE) 1000 MG tablet, Take 1 tablet (1,000 mg total) by mouth 2 (two) times a day with meals, Disp: 180 tablet, Rfl: 1    Multiple Vitamin (multivitamin) tablet, Take 1 tablet by mouth daily, Disp: , Rfl:     Omega-3 Fatty Acids (FISH OIL PO), Take by mouth, Disp: , Rfl:     pantoprazole (PROTONIX) 40 mg tablet, Take 1 tablet (40 mg total) by mouth daily, Disp: 30 tablet, Rfl: 0    rimegepant sulfate (Nurtec) 75 mg TBDP, Take one NURTEC 75 mg at onset under tongue. Limit 1 in 24 hours, Disp: 16 tablet, Rfl: 6    terconazole (TERAZOL 7) 0.4 % vaginal cream, Insert 1  applicator into the vagina daily at bedtime, Disp: 45 g, Rfl: 1    topiramate (TOPAMAX) 25 mg tablet, Take 2 tablets (50 mg total) by mouth 2 (two) times a day, Disp: 120 tablet, Rfl: 6    Allergies   Allergen Reactions    Lisinopril Cough    Monosodium Glutamate - Food Allergy Palpitations    Other Palpitations     Pt is allergic to MSG, says she gets a fast heart rate and palpitations       Physical Exam:   Vitals:    01/24/25 0914   BP: 122/78   Pulse: 105   Temp: 98.4 °F (36.9 °C)   SpO2: 99%     General: Awake, Alert, Oriented x 3, Mood and affect appropriate  Respiratory: Respirations even and unlabored  Cardiovascular: Peripheral pulses intact; no edema  Musculoskeletal Exam: N/a    Assessment: Biopsy proven positive for basal cell carcinoma, at least, superficial, and nodular type on posterior left ear.    Plan: Mohs     Time of H&P Completion:942    MOHS Procedure Timeout      Flowsheet Row Most Recent Value   Timeout: 0948   Patient Identity Verified: Yes   Correct Site Verified: Yes   Correct Procedure Verified: Yes            MOHS Diagnosis/Indication/Location/ID      Flowsheet Row Most Recent Value   Pathology Type Basal cell carcinoma   Anatomic Site left posterior ear   Indications for MOHS tumor location   MOHS ID [id number]            MOHS Site/Accession/Pre-Post      Flowsheet Row Most Recent Value   Original Site Identified (as submitted by referring clinician) Photo, Referral   Biopsy Accession/Specimen # (as submitted by referring clincian) [id number]   Pre-MOHS Size Length (cm) 1   Pre-MOHS Size Width (cm) 1.5   Post-MOHS Size-Length (cm) 2   Post MOHS Size-Width (cm) 2.5   Repair Type Second intention   Anesthetic Used 1% Lidocaine with epinephrine  [buffered]            MOHS Tumor Stage 1 Information      Flowsheet Row Most Recent Value   Tissue Sections (blocks) 2   Microscopic Exam Section 1: Emanating from the epidermis and infiltrating the dermis are irregularly shaped islands of basaloid  keratinocytes. The nuclei at the periphery of the islands have a palisaded arrangement. Islands are associated with a fibromyxoid stroma and clefting.   Microscopic Exam Section 2: Emanating from the epidermis and infiltrating the dermis are irregularly shaped islands of basaloid keratinocytes. The nuclei at the periphery of the islands have a palisaded arrangement. Islands are associated with a fibromyxoid stroma and clefting.   Tumor Clear After Stage I? No          MOHS Tumor Stage 2 Information      Flowsheet Row Most Recent Value   Tissue Sections (blocks) 2   Microscopic Exam Section 1: No tumor identified.   Microscopic Exam Section 2: No tumor identified.   Tumor Clear After Stage I? Yes                  Patient identified, procedure verified, site identified and verified. Time out completed. Surgical removal of the lesion discussed with the patient (risks and benefits, including possibility of scarring, infection, recurrence or potential for further treatment)  I have specifically identified the site with the patient. I have discussed the fact that the patient will have a scar after the procedure regardless of granulation or repair with sutures. I have discussed that the repair options can range from granulation in some cases to linear or curvilinear closures to larger flaps or grafts.  There are sometimes flaps or grafts used that require multiples stages of surgery and will not be completed today, rather be completed over a series of appointments. I have discussed that occasionally due to location, size or depth of the lesion I may recommend consultation with and transfer of care for further removal or the reconstruction to another provider such as ophthalmology surgery, plastic surgery, ENT surgery, or surgical oncology. There are cases in which other testing such as imaging with MRI or CT scan or testing of lymph nodes is recommended because of the nature/depth/location of tumor seen during the removal.  There is a risk of injury to nerves causing temporary or permanent numbness or the inability to move muscles full such as the inability to lift eyebrows. Questions answered and verbal and written consent was obtained.    The tumor qualifies for Mohs based on AUC criteria. Dr. Ureña served as the surgeon and pathologist during the procedure.    BCC cleared with 2 stages of mohs and allowed to heal secondarily given tightness of the skin.  Well tolerated.  Wound check in 2-3 weeks.    Scribe Attestation      I,:  Flori Mckeon MA am acting as a scribe while in the presence of the attending physician.:       I,:  Carl Ureña MD personally performed the services described in this documentation    as scribed in my presence.:

## 2025-01-27 ENCOUNTER — TELEPHONE (OUTPATIENT)
Age: 60
End: 2025-01-27

## 2025-01-27 NOTE — TELEPHONE ENCOUNTER
Pt called, she had procedure done 01/24/25 to remove cancer from her left ear.   She is taking tylenol at the advice of dermatologist with no relief.  She is asking if provider would be able to prescribe something stronger for the pain.  Just a few pills to get her through, she stated it is quite painful.  [facility].

## 2025-01-27 NOTE — TELEPHONE ENCOUNTER
Recommended Tylenol 500 mg every 4 hours for mild pain and or ibuprofen 400 mg every 8 hours for moderate pain.  Additionally can do some cold compress.

## 2025-01-29 ENCOUNTER — TELEPHONE (OUTPATIENT)
Age: 60
End: 2025-01-29

## 2025-01-29 NOTE — TELEPHONE ENCOUNTER
Patient is still having pain and would like something called. I spoke with patient and told her the recommendation that  stated but patient called again because its not helping.

## 2025-01-29 NOTE — TELEPHONE ENCOUNTER
The patient's  called to report the patient called this morning regarding a pain that she has at this moment is no note on file please contact the patient

## 2025-01-30 NOTE — TELEPHONE ENCOUNTER
Spoke with patient and she stated her pain is getting slight better. Patient will follow up on 02/04.

## 2025-02-11 DIAGNOSIS — F41.9 ANXIETY: ICD-10-CM

## 2025-02-11 RX ORDER — HYDROXYZINE HYDROCHLORIDE 25 MG/1
25 TABLET, FILM COATED ORAL EVERY 6 HOURS PRN
Qty: 30 TABLET | Refills: 0 | Status: SHIPPED | OUTPATIENT
Start: 2025-02-11

## 2025-02-11 NOTE — TELEPHONE ENCOUNTER
Reason for call: 2 pills left   [x] Refill   [] Prior Auth  [] Other:     Office:   [x] PCP/Provider -   [] Specialty/Provider -     Medication: hydroxyzine     Dose/Frequency: 25 mg Q6H PRN     Quantity: 30 w refill     Pharmacy: [facility] on file     Does the patient have enough for 3 days?   [] Yes   [x] No - Send as HP to POD

## 2025-02-13 ENCOUNTER — TELEPHONE (OUTPATIENT)
Age: 60
End: 2025-02-13

## 2025-02-13 NOTE — TELEPHONE ENCOUNTER
Rec'd call from patient requesting to r/s tomorrow's post op.    Please contact patient at your earliest convenience. Thank you.

## 2025-02-22 DIAGNOSIS — I10 HYPERTENSION, UNSPECIFIED TYPE: ICD-10-CM

## 2025-02-22 DIAGNOSIS — Z87.19 H/O GASTROESOPHAGEAL REFLUX (GERD): ICD-10-CM

## 2025-02-22 DIAGNOSIS — N89.8 VAGINA ITCHING: ICD-10-CM

## 2025-02-22 DIAGNOSIS — B37.31 YEAST VAGINITIS: ICD-10-CM

## 2025-02-22 RX ORDER — LOSARTAN POTASSIUM 50 MG/1
50 TABLET ORAL DAILY
Qty: 90 TABLET | Refills: 0 | Status: SHIPPED | OUTPATIENT
Start: 2025-02-22

## 2025-02-22 RX ORDER — PANTOPRAZOLE SODIUM 40 MG/1
40 TABLET, DELAYED RELEASE ORAL DAILY
Qty: 30 TABLET | Refills: 0 | Status: SHIPPED | OUTPATIENT
Start: 2025-02-22

## 2025-02-24 NOTE — TELEPHONE ENCOUNTER
I called patient and she said she is having symptoms of a yeast infection and wanted a script for diflucan. It does not look like she has been seen to evaluate yeast infection and I said she would need an appt. She said she will call back to schedule. Please cancel script for diflucan.

## 2025-02-25 DIAGNOSIS — E11.9 CONTROLLED TYPE 2 DIABETES MELLITUS WITHOUT COMPLICATION, WITHOUT LONG-TERM CURRENT USE OF INSULIN (HCC): ICD-10-CM

## 2025-02-25 RX ORDER — FLUCONAZOLE 150 MG/1
150 TABLET ORAL ONCE
Qty: 1 TABLET | Refills: 0 | Status: SHIPPED | OUTPATIENT
Start: 2025-02-25 | End: 2025-02-25

## 2025-02-25 NOTE — TELEPHONE ENCOUNTER
Reason for call:   [x] Refill   [] Prior Auth  [] Other:     Office:   [x] PCP/Provider - [facility]/ MD Sriram  [] Specialty/Provider -     Medication: metFORMIN (GLUCOPHAGE) 1000 MG tablet     Dose/Frequency: Take 1 tablet (1,000 mg total) by mouth 2 (two) times a day with meals    Quantity: 180    Pharmacy: [id number] [place], [place] - [address] [phone]    Does the patient have enough for 3 days?   [] Yes   [x] No - Send as HP to POD

## 2025-02-26 ENCOUNTER — TELEPHONE (OUTPATIENT)
Age: 60
End: 2025-02-26

## 2025-02-26 NOTE — TELEPHONE ENCOUNTER
Rec'd call from patient she is under the weather with fever and she thinks she has the FLU.    Patient stated that she will call back to reschedule the appointment.    Asked patient how is the Ear going and patient stated it's healing well.

## 2025-03-04 ENCOUNTER — PATIENT OUTREACH (OUTPATIENT)
Facility: HOSPITAL | Age: 60
End: 2025-03-04

## 2025-03-04 NOTE — PROGRESS NOTES
I spoke with Leeanna to enroll into [facility] for upcoming Breast biopsy, however Leeanna did not qualify due to income. I reviewed with Edwin Hernandez patient will be covered under [employer]. I informed patient, patient understood.

## 2025-03-06 ENCOUNTER — HOSPITAL ENCOUNTER (OUTPATIENT)
Dept: MAMMOGRAPHY | Facility: CLINIC | Age: 60
Discharge: HOME | End: 2025-03-06

## 2025-03-06 ENCOUNTER — HOSPITAL ENCOUNTER (OUTPATIENT)
Dept: ULTRASOUND IMAGING | Facility: CLINIC | Age: 60
Discharge: HOME | End: 2025-03-06

## 2025-03-06 VITALS
SYSTOLIC BLOOD PRESSURE: 136 MMHG | DIASTOLIC BLOOD PRESSURE: 92 MMHG | HEIGHT: 64 IN | HEART RATE: 104 BPM | BODY MASS INDEX: 29.88 KG/M2 | WEIGHT: 175 LBS

## 2025-03-06 VITALS — DIASTOLIC BLOOD PRESSURE: 86 MMHG | SYSTOLIC BLOOD PRESSURE: 136 MMHG | HEART RATE: 106 BPM

## 2025-03-06 DIAGNOSIS — R92.8 ABNORMAL MAMMOGRAM: ICD-10-CM

## 2025-03-06 PROCEDURE — 19083 BX BREAST 1ST LESION US IMAG: CPT

## 2025-03-06 PROCEDURE — A4648 IMPLANTABLE TISSUE MARKER: HCPCS

## 2025-03-06 PROCEDURE — 88305 TISSUE EXAM BY PATHOLOGIST: CPT | Performed by: PATHOLOGY

## 2025-03-06 PROCEDURE — 88341 IMHCHEM/IMCYTCHM EA ADD ANTB: CPT | Performed by: PATHOLOGY

## 2025-03-06 PROCEDURE — 88342 IMHCHEM/IMCYTCHM 1ST ANTB: CPT | Performed by: PATHOLOGY

## 2025-03-06 PROCEDURE — 19081 BX BREAST 1ST LESION STRTCTC: CPT

## 2025-03-06 RX ORDER — LIDOCAINE HYDROCHLORIDE AND EPINEPHRINE BITARTRATE 20; .01 MG/ML; MG/ML
5 INJECTION, SOLUTION SUBCUTANEOUS ONCE
Status: COMPLETED | OUTPATIENT
Start: 2025-03-06 | End: 2025-03-06

## 2025-03-06 RX ORDER — LIDOCAINE HYDROCHLORIDE 10 MG/ML
5 INJECTION, SOLUTION EPIDURAL; INFILTRATION; INTRACAUDAL; PERINEURAL ONCE
Status: COMPLETED | OUTPATIENT
Start: 2025-03-06 | End: 2025-03-06

## 2025-03-06 RX ORDER — LIDOCAINE HYDROCHLORIDE AND EPINEPHRINE BITARTRATE 20; .01 MG/ML; MG/ML
10 INJECTION, SOLUTION SUBCUTANEOUS ONCE
Status: COMPLETED | OUTPATIENT
Start: 2025-03-06 | End: 2025-03-06

## 2025-03-06 RX ADMIN — LIDOCAINE HYDROCHLORIDE 5 ML: 10 INJECTION, SOLUTION EPIDURAL; INFILTRATION; INTRACAUDAL; PERINEURAL at 09:23

## 2025-03-06 RX ADMIN — LIDOCAINE HYDROCHLORIDE,EPINEPHRINE BITARTRATE 10 ML: 20; .01 INJECTION, SOLUTION INFILTRATION; PERINEURAL at 09:23

## 2025-03-06 RX ADMIN — LIDOCAINE HYDROCHLORIDE,EPINEPHRINE BITARTRATE 5 ML: 20; .01 INJECTION, SOLUTION INFILTRATION; PERINEURAL at 10:05

## 2025-03-06 RX ADMIN — LIDOCAINE HYDROCHLORIDE 5 ML: 10 INJECTION, SOLUTION EPIDURAL; INFILTRATION; INTRACAUDAL; PERINEURAL at 10:05

## 2025-03-06 NOTE — PROGRESS NOTES
Procedure type:    _____ultrasound guided _____Xstereotactic _____ MRI guided    Breast:    __X___Left _____Right    Location: 9 :00    Needle: 10ga     # of passes: 2 cores with calcs and 1 core without    Clip:Nahum    Performed by: Dr. Llamas    Pressure held for 5 minutes by: Snow Dotson(PCA)    Steri Strips:    ____X_yes _____no    Band aid:    ___X__yes_____no    Tape and guaze:    _____yes ___X__no    Tolerated procedure:    ___X__yes _____no

## 2025-03-06 NOTE — PROGRESS NOTES
Ice pack given:    __X___yes _____no      Discharge instructions reviewed & given to patient:    ___X__yes _____no      Biopsy site clean and dry with no bleeding on discharge:    __X___yes ____no      Discharged via:    ___X__ambulatory    _____wheelchair    _____stretcher

## 2025-03-06 NOTE — PROGRESS NOTES
Patient arrived via:    __X___ambulatory    _____wheelchair    _____stretcher      Domestic violence screen    ____X__negative______positive    Breast Implants:    _______yes ___X_____no

## 2025-03-07 NOTE — PROGRESS NOTES
Post procedure call completed    Bleeding: _____yes __X___no    Pain: _____yes ___X___no    Redness/Swelling: ______yes ___X___no    Band aid removed: _____yes ___X__no (discussed removing when she showers)    Steri-Strips intact: ___X___yes _____no (discussed with patient to remove steri strips on Tues if they have not come off on their own)    Pt with no questions at this time, adv will call when results available, adv to call with any questions or concerns, has name/# for contact

## 2025-03-10 ENCOUNTER — TELEPHONE (OUTPATIENT)
Dept: MAMMOGRAPHY | Facility: CLINIC | Age: 60
End: 2025-03-10

## 2025-03-10 DIAGNOSIS — N64.59 INVERSION OF RIGHT NIPPLE: Primary | ICD-10-CM

## 2025-03-10 PROCEDURE — 88305 TISSUE EXAM BY PATHOLOGIST: CPT | Performed by: PATHOLOGY

## 2025-03-10 PROCEDURE — 88342 IMHCHEM/IMCYTCHM 1ST ANTB: CPT | Performed by: PATHOLOGY

## 2025-03-10 PROCEDURE — NC001 PR NO CHARGE: Performed by: PATHOLOGY

## 2025-03-10 PROCEDURE — 88341 IMHCHEM/IMCYTCHM EA ADD ANTB: CPT | Performed by: PATHOLOGY

## 2025-03-10 NOTE — TELEPHONE ENCOUNTER
Patient given benign breast biopsy results. Per Dr. Martinez's recommendation a referral is being placed to Dr. Cardarelli breast surgeon. A breast MRI is being recommended as well. I am Cc'ing Miri Estrada on this message to order the MRI and patient is aware she will need to call central scheduling to schedule MRI once orders are placed. All questions answered. Has name and number for [facility] for any future needs. Patient is very grateful for the care she received during the biopsy

## 2025-03-11 ENCOUNTER — TELEPHONE (OUTPATIENT)
Dept: HEMATOLOGY ONCOLOGY | Facility: CLINIC | Age: 60
End: 2025-03-11

## 2025-03-11 NOTE — TELEPHONE ENCOUNTER
Referral to Surgical Oncology  received.  Chart reviewed by  for Surgical oncology at this time.       Diagnosis:   N64.59 (ICD-10-CM) - Inversion of right nipple     After review of chart, instructions for scheduling added to referral and sent to be scheduled as advised.

## 2025-03-13 DIAGNOSIS — F41.9 ANXIETY: ICD-10-CM

## 2025-03-13 RX ORDER — HYDROXYZINE HYDROCHLORIDE 25 MG/1
25 TABLET, FILM COATED ORAL EVERY 6 HOURS PRN
Qty: 30 TABLET | Refills: 0 | Status: SHIPPED | OUTPATIENT
Start: 2025-03-13

## 2025-03-14 ENCOUNTER — TELEPHONE (OUTPATIENT)
Dept: NEUROLOGY | Facility: CLINIC | Age: 60
End: 2025-03-14

## 2025-03-18 NOTE — TELEPHONE ENCOUNTER
Pt called back to confirm that her appt is canceled. Pt stated she will call back when she is ready. Pt expressed her gratitude towards  and the team. She thanks everyone.    No

## 2025-03-28 DIAGNOSIS — F41.9 ANXIETY: ICD-10-CM

## 2025-03-28 RX ORDER — HYDROXYZINE HYDROCHLORIDE 25 MG/1
25 TABLET, FILM COATED ORAL EVERY 6 HOURS PRN
Qty: 30 TABLET | Refills: 0 | Status: SHIPPED | OUTPATIENT
Start: 2025-03-28

## 2025-03-28 NOTE — TELEPHONE ENCOUNTER
Pharmacy states they did not received 3/13 script    Reason for call:   [x] Refill   [] Prior Auth  [] Other:     Office:   [x] PCP/Provider -   [] Specialty/Provider -     Medication: hydrOXYzine HCL (ATARAX) 25 mg tablet     Dose/Frequency: leanne 1 tablet (25 mg total) by mouth every 6 (six) hours as needed for itching     Quantity: 90    Pharmacy: [id number] - [place]     Local Pharmacy   Does the patient have enough for 3 days?   [x] Yes   [] No - Send as HP to POD    Mail Away Pharmacy   Does the patient have enough for 10 days?   [] Yes   [] No - Send as HP to POD

## 2025-04-17 ENCOUNTER — TELEPHONE (OUTPATIENT)
Age: 60
End: 2025-04-17

## 2025-04-17 NOTE — TELEPHONE ENCOUNTER
Patient called and stated she received news the day before yesterday that her  only has 3 months - 1 year to live.  She is trying to be strong and hold it together for him.  She would like to know if Dr. Stack is willing to send a prescription to [facility] for something to help calm her nerves.  She is willing to come in for a visit if needed, however, she is afraid to leave her  alone as she is his caregiver and he is frail. Patient also says hi to Imani.  Please advise.  Thank you!

## 2025-04-17 NOTE — TELEPHONE ENCOUNTER
I spoke with patient and stated if she is requesting a new medication it will require an appointment because it for document purposes and just to discuss with patient on what's going on. Patient understood and schedule an apt for tomorrow.

## 2025-05-02 ENCOUNTER — TELEPHONE (OUTPATIENT)
Age: 60
End: 2025-05-02

## 2025-05-02 NOTE — TELEPHONE ENCOUNTER
Patient called to ask if Dr Stack would be able to send in something to help keep her calm.  She will be calling soon to schedule her follow up appointment with Dr Stack, but currently she is at [facility]ensive care unit with her .  He is off all of his machines now and is on death watch.  She said the other medication Dr Stack gave her is not helping, so she is hoping to have something different sent to [facility].  Please call her to advise.  Thank you!

## 2025-05-06 ENCOUNTER — TELEPHONE (OUTPATIENT)
Age: 60
End: 2025-05-06

## 2025-05-06 ENCOUNTER — OFFICE VISIT (OUTPATIENT)
Age: 60
End: 2025-05-06
Payer: MEDICARE

## 2025-05-06 VITALS
OXYGEN SATURATION: 100 % | WEIGHT: 171 LBS | TEMPERATURE: 97.7 F | HEART RATE: 100 BPM | SYSTOLIC BLOOD PRESSURE: 170 MMHG | BODY MASS INDEX: 28.49 KG/M2 | HEIGHT: 65 IN | DIASTOLIC BLOOD PRESSURE: 104 MMHG | RESPIRATION RATE: 16 BRPM

## 2025-05-06 DIAGNOSIS — F41.9 ANXIETY: ICD-10-CM

## 2025-05-06 DIAGNOSIS — F41.8 MIXED ANXIETY DEPRESSIVE DISORDER: ICD-10-CM

## 2025-05-06 DIAGNOSIS — Z91.89 HISTORY OF FROSTBITE: ICD-10-CM

## 2025-05-06 DIAGNOSIS — E78.2 MIXED HYPERLIPIDEMIA: ICD-10-CM

## 2025-05-06 DIAGNOSIS — E11.9 CONTROLLED TYPE 2 DIABETES MELLITUS WITHOUT COMPLICATION, WITHOUT LONG-TERM CURRENT USE OF INSULIN (HCC): ICD-10-CM

## 2025-05-06 DIAGNOSIS — F43.21 GRIEF: Primary | ICD-10-CM

## 2025-05-06 DIAGNOSIS — D23.9 DERMATOFIBROMA: ICD-10-CM

## 2025-05-06 DIAGNOSIS — I10 ESSENTIAL HYPERTENSION: ICD-10-CM

## 2025-05-06 PROCEDURE — 99214 OFFICE O/P EST MOD 30 MIN: CPT | Performed by: INTERNAL MEDICINE

## 2025-05-06 RX ORDER — LORAZEPAM 0.5 MG/1
0.5 TABLET ORAL DAILY PRN
Qty: 10 TABLET | Refills: 0 | Status: SHIPPED | OUTPATIENT
Start: 2025-05-06

## 2025-05-06 RX ORDER — KETOCONAZOLE 20 MG/ML
SHAMPOO, SUSPENSION TOPICAL
Qty: 120 ML | Refills: 6 | Status: SHIPPED | OUTPATIENT
Start: 2025-05-06

## 2025-05-06 RX ORDER — ESCITALOPRAM OXALATE 5 MG/1
5 TABLET ORAL DAILY
Qty: 90 TABLET | Refills: 0 | Status: SHIPPED | OUTPATIENT
Start: 2025-05-06

## 2025-05-06 NOTE — ASSESSMENT & PLAN NOTE
Panel was elevated, given diabetic state patient needs to be on a statin, will discuss in next office visit  Orders:    CBC and differential; Future    Comprehensive metabolic panel; Future    Lipid panel; Future

## 2025-05-06 NOTE — PROGRESS NOTES
Name: Leeanna Sanabria      : 1965      MRN: [id number]  Encounter Provider: Nicholas Stack MD  Encounter Date: 2025   Encounter department: [facility] PRIMARY CARE  :  Assessment & Plan  Grief  Patient is grieving the loss of her   Patient feeling anxious and overwhelmed  Orders:    escitalopram (LEXAPRO) 5 mg tablet; Take 1 tablet (5 mg total) by mouth daily    LORazepam (Ativan) 0.5 mg tablet; Take 1 tablet (0.5 mg total) by mouth daily as needed for anxiety    CBC and differential; Future    Comprehensive metabolic panel; Future    Anxiety  We discussed medication options, will start on low-dose antidepressant, counseled that her medication takes time to have its impact, will start on Ativan in the interim to help patient deal with her situation.  Cautioned not to combine it with alcohol, caution for drowsiness.  Patient verbalized understanding follow-up in 3 months or earlier if needed  Orders:    escitalopram (LEXAPRO) 5 mg tablet; Take 1 tablet (5 mg total) by mouth daily    LORazepam (Ativan) 0.5 mg tablet; Take 1 tablet (0.5 mg total) by mouth daily as needed for anxiety    CBC and differential; Future    Comprehensive metabolic panel; Future    Controlled type 2 diabetes mellitus without complication, without long-term current use of insulin ([facility])    Lab Results   Component Value Date    HGBA1C 6.6 (H) 2025   HbA1c stable, patient has stopped her metformin due to GI intolerance, is following diabetic diet and lifestyle modification to help control.  Follow-up with blood work    Orders:    CBC and differential; Future    Comprehensive metabolic panel; Future    Essential hypertension  Elevated this a.m., previously BP has been controlled  Continue losartan, follow-up with home BP logs    Orders:    CBC and differential; Future    Comprehensive metabolic panel; Future    Mixed anxiety depressive disorder  Depression Screening Follow-up Plan: Patient's  depression screening was positive with a PHQ-9 score of 11. Patient with underlying depression and was advised to continue current medications as prescribed.  Worsened symptoms with recent demise of her , see plan as above in anxiety    Orders:    escitalopram (LEXAPRO) 5 mg tablet; Take 1 tablet (5 mg total) by mouth daily    LORazepam (Ativan) 0.5 mg tablet; Take 1 tablet (0.5 mg total) by mouth daily as needed for anxiety    CBC and differential; Future    Comprehensive metabolic panel; Future    Dermatofibroma    Orders:    ketoconazole (NIZORAL) 2 % shampoo; Use daily for two weeks then use 3 times a week    CBC and differential; Future    Comprehensive metabolic panel; Future    History of frostbite  Patient reports a remote frostbite of her right middle finger in December 2024, patient reports onset of pain and discomfort, will refer to vascular surgery  Orders:    Ambulatory Referral to Vascular Surgery; Future    CBC and differential; Future    Comprehensive metabolic panel; Future    Mixed hyperlipidemia  Panel was elevated, given diabetic state patient needs to be on a statin, will discuss in next office visit  Orders:    CBC and differential; Future    Comprehensive metabolic panel; Future    Lipid panel; Future          Depression Screening and Follow-up Plan:   Patient with underlying depression and was advised to continue current medications as prescribed.       History of Present Illness   HPI    Patient comes for follow-up of chronic medical conditions, review of recent labs and imaging wherever applicable.  Denies any chest pain, shortness of breath, nausea or vomiting.     Patient reports worsening mood symptoms and anxiety, she is grieving the loss of her  a few days back.    Past Medical History   Past Medical History:   Diagnosis Date    Basal cell carcinoma 06/21/2024    posterior left ear-Mohs    Diabetes mellitus (HCC)     Hypertension     Psychiatric disorder     depression      Past Surgical History:   Procedure Laterality Date    MAMMO STEREOTACTIC BREAST BIOPSY LEFT (ALL INC) Left 05/01/2014    MAMMO STEREOTACTIC BREAST BIOPSY LEFT (ALL INC) Left 3/6/2025    MOHS SURGERY Left 01/24/2025    BCC posterior left ear:     US GUIDED BREAST BIOPSY RIGHT COMPLETE Right 3/6/2025     Family History   Problem Relation Age of Onset    No Known Problems Mother     No Known Problems Father     No Known Problems Maternal Grandmother     No Known Problems Maternal Grandfather     No Known Problems Paternal Grandmother     No Known Problems Paternal Grandfather     No Known Problems Maternal Aunt     No Known Problems Half-Sister     No Known Problems Half-Sister     No Known Problems Half-Brother     Breast cancer Neg Hx     Colon cancer Neg Hx     Ovarian cancer Neg Hx     Endometrial cancer Neg Hx     BRCA2 Positive Neg Hx     BRCA2 Negative Neg Hx     BRCA1 Positive Neg Hx     BRCA1 Negative Neg Hx     BRCA 1/2 Neg Hx     Breast cancer additional onset Neg Hx       reports that she has been smoking cigarettes. She started smoking 5 days ago. She has been exposed to tobacco smoke. She has never used smokeless tobacco. She reports current alcohol use of about 4.0 standard drinks of alcohol per week. She reports that she does not use drugs.  Current Outpatient Medications   Medication Instructions    aspirin 81 mg chewable tablet Chew    calcium-vitamin D (Oscal 500/200 D-3) 500 mg-200 units per tablet Take by mouth    Cyanocobalamin (VITAMIN B 12 PO) Take by mouth    ergocalciferol (VITAMIN D2) 50,000 Units, Oral, Weekly    escitalopram (LEXAPRO) 5 mg, Oral, Daily    ketoconazole (NIZORAL) 2 % shampoo Use daily for two weeks then use 3 times a week    LORazepam (ATIVAN) 0.5 mg, Oral, Daily PRN    losartan (COZAAR) 50 mg, Oral, Daily    metFORMIN (GLUCOPHAGE) 1,000 mg, Oral, 2 times daily with meals    Multiple Vitamin (multivitamin) tablet 1 tablet, Daily    Omega-3 Fatty Acids (FISH OIL  "PO) Take by mouth    pantoprazole (PROTONIX) 40 mg, Oral, Daily    rimegepant sulfate (Nurtec) 75 mg TBDP Take one NURTEC 75 mg at onset under tongue. Limit 1 in 24 hours    terconazole (TERAZOL 7) 0.4 % vaginal cream 1 applicator, Vaginal, Daily at bedtime    topiramate (TOPAMAX) 50 mg, Oral, 2 times daily     Allergies   Allergen Reactions    Lisinopril Cough    Monosodium Glutamate - Food Allergy Palpitations    Other Palpitations     Pt is allergic to MSG, says she gets a fast heart rate and palpitations      Review of Systems   Constitutional:  Negative for appetite change, chills, diaphoresis, fatigue, fever and unexpected weight change.   Respiratory:  Negative for apnea, cough, choking, chest tightness, shortness of breath, wheezing and stridor.    Cardiovascular:  Negative for chest pain, palpitations and leg swelling.   Gastrointestinal:  Negative for abdominal distention, abdominal pain, anal bleeding, blood in stool, constipation, diarrhea, nausea and vomiting.   Genitourinary:  Negative for decreased urine volume, difficulty urinating, frequency and urgency.   Musculoskeletal:  Negative for arthralgias, back pain and myalgias.   Neurological:  Negative for dizziness, light-headedness, numbness and headaches.   Psychiatric/Behavioral:  Positive for dysphoric mood and sleep disturbance. The patient is nervous/anxious.        Objective   BP (!) 170/104 (Patient Position: Sitting, Cuff Size: Adult)   Pulse 100   Temp 97.7 °F (36.5 °C) (Temporal)   Resp 16   Ht 5' 4.5\" (1.638 m)   Wt 77.6 kg (171 lb)   SpO2 100%   BMI 28.90 kg/m²      Physical Exam  Constitutional:       General: She is not in acute distress.     Appearance: Normal appearance. She is normal weight. She is not ill-appearing, toxic-appearing or diaphoretic.   Cardiovascular:      Rate and Rhythm: Normal rate and regular rhythm.      Pulses: Normal pulses.      Heart sounds: Normal heart sounds. No murmur heard.     No gallop. "   Pulmonary:      Effort: Pulmonary effort is normal. No respiratory distress.      Breath sounds: Normal breath sounds. No stridor. No wheezing, rhonchi or rales.   Chest:      Chest wall: No tenderness.   Musculoskeletal:      Right lower leg: No edema.      Left lower leg: No edema.   Neurological:      Mental Status: She is alert and oriented to person, place, and time.   Psychiatric:         Mood and Affect: Affect is tearful.

## 2025-05-06 NOTE — TELEPHONE ENCOUNTER
PA escitalopram (LEXAPRO) 5 mg SUBMITTED    to [employer]     via    [x]CMM-KEY: [id number]   []Surescripts-Case ID #    []Availity-Auth ID #  NDC #    []Faxed to plan   []Other website     []Phone call Case ID #      []PA sent as URGENT    All office notes, labs and other pertaining documents and studies sent. Clinical questions answered. Awaiting determination from insurance company.     Turnaround time for your insurance to make a decision on your Prior Authorization can take 7-21 business days.

## 2025-05-06 NOTE — ASSESSMENT & PLAN NOTE
Lab Results   Component Value Date    HGBA1C 6.6 (H) 01/14/2025   HbA1c stable, patient has stopped her metformin due to GI intolerance, is following diabetic diet and lifestyle modification to help control.  Follow-up with blood work    Orders:    CBC and differential; Future    Comprehensive metabolic panel; Future

## 2025-05-06 NOTE — ASSESSMENT & PLAN NOTE
Elevated this a.m., previously BP has been controlled  Continue losartan, follow-up with home BP logs    Orders:    CBC and differential; Future    Comprehensive metabolic panel; Future

## 2025-05-27 DIAGNOSIS — F41.8 MIXED ANXIETY DEPRESSIVE DISORDER: ICD-10-CM

## 2025-05-27 DIAGNOSIS — Z87.19 H/O GASTROESOPHAGEAL REFLUX (GERD): ICD-10-CM

## 2025-05-27 DIAGNOSIS — F43.21 GRIEF: ICD-10-CM

## 2025-05-27 DIAGNOSIS — F41.9 ANXIETY: ICD-10-CM

## 2025-05-27 DIAGNOSIS — I10 HYPERTENSION, UNSPECIFIED TYPE: ICD-10-CM

## 2025-05-27 RX ORDER — PANTOPRAZOLE SODIUM 40 MG/1
40 TABLET, DELAYED RELEASE ORAL DAILY
Qty: 30 TABLET | Refills: 0 | Status: SHIPPED | OUTPATIENT
Start: 2025-05-27

## 2025-05-27 RX ORDER — LOSARTAN POTASSIUM 50 MG/1
50 TABLET ORAL DAILY
Qty: 90 TABLET | Refills: 0 | Status: SHIPPED | OUTPATIENT
Start: 2025-05-27

## 2025-05-27 NOTE — TELEPHONE ENCOUNTER
Reason for call:   [x] Refill   [] Prior Auth  [] Other:     Office:   [x] PCP/Provider -   [] Specialty/Provider -     Medication:     Pantoprazole 40 mg tablet taken by mouth once daily #90 tabs     Losartan 50 mg tablet taken by mouth once daily #90 tabs     Pharmacy: [id number] - [place] - [address] [phone]     Local Pharmacy   Does the patient have enough for 3 days?   [] Yes   [x] No - Send as HP to POD    Mail Away Pharmacy   Does the patient have enough for 10 days?   [] Yes   [] No - Send as HP to POD

## 2025-05-28 RX ORDER — LORAZEPAM 0.5 MG/1
TABLET ORAL
Qty: 10 TABLET | Refills: 0 | Status: SHIPPED | OUTPATIENT
Start: 2025-05-28

## 2025-06-04 ENCOUNTER — TELEPHONE (OUTPATIENT)
Age: 60
End: 2025-06-04

## 2025-06-04 NOTE — TELEPHONE ENCOUNTER
"Patient called stated her medication are not helping her and making her worse. Patient stated she does not like how the Lexapro made her feel causing lots of nightmares and mental health. She said after losing her  she is still grieving and crying. She stated the Ativan is also not helping much she wants to know if she can increase or take something different. She said \"I do want no more antidepressant medication I do not like how they make me feel.\"    "

## 2025-06-05 NOTE — TELEPHONE ENCOUNTER
Patient can taper Lexapro to 1 tablet every other day for 1 week and then stop it if she does not like it is a side effect.  Patient needs an office visit to consider new medications.

## 2025-06-05 NOTE — TELEPHONE ENCOUNTER
"I spoke with patient and she does not want to take this medication at all. She stated \" I need her to know that I lost my  and grieving really bad. I am very sad and I feel like she is not understanding what is going on. \" She wants to know about the ativan medication if can increase. I told her we understand what going on with her and I was comforting her but she feels like we are not listening to her and she refused to come in the office. I also offered her virtual visit but declined. The patient hung up on me.   "

## 2025-06-20 DIAGNOSIS — F43.21 GRIEF: ICD-10-CM

## 2025-06-20 DIAGNOSIS — F41.9 ANXIETY: ICD-10-CM

## 2025-06-20 DIAGNOSIS — F41.8 MIXED ANXIETY DEPRESSIVE DISORDER: ICD-10-CM

## 2025-06-20 RX ORDER — LORAZEPAM 0.5 MG/1
TABLET ORAL
Qty: 10 TABLET | Refills: 0 | OUTPATIENT
Start: 2025-06-20

## 2025-06-20 NOTE — TELEPHONE ENCOUNTER
See telephone encounter 6/4/2025.  Patient reported that Ativan was not working for her.  Does she still require any refills.

## 2025-06-27 ENCOUNTER — TELEPHONE (OUTPATIENT)
Age: 60
End: 2025-06-27

## 2025-06-27 RX ORDER — LORAZEPAM 0.5 MG/1
0.5 TABLET ORAL DAILY PRN
Qty: 10 TABLET | Refills: 0 | Status: SHIPPED | OUTPATIENT
Start: 2025-06-27

## 2025-06-27 NOTE — TELEPHONE ENCOUNTER
Patient called, she is requesting a refill of her ativan.  She is still grieving the loss of her  and states that the reality of the situation is finally sinking in.  Can a refill please be sent over to the [facility] pharmacy today for her?  She is completely out of the medication and states that she thinks it was helping her.  Please call her back when this is done.

## 2025-08-12 ENCOUNTER — TELEPHONE (OUTPATIENT)
Dept: NEUROLOGY | Facility: CLINIC | Age: 60
End: 2025-08-12